# Patient Record
Sex: FEMALE | Race: WHITE | NOT HISPANIC OR LATINO | Employment: UNEMPLOYED | URBAN - NONMETROPOLITAN AREA
[De-identification: names, ages, dates, MRNs, and addresses within clinical notes are randomized per-mention and may not be internally consistent; named-entity substitution may affect disease eponyms.]

---

## 2021-06-29 LAB
EXTERNAL ABO GROUPING: NORMAL
EXTERNAL ANTIBODY SCREEN: NEGATIVE
EXTERNAL CHLAMYDIA SCREEN: NEGATIVE
EXTERNAL GONORRHEA SCREEN: NEGATIVE
EXTERNAL HEPATITIS B SURFACE ANTIGEN: NEGATIVE
EXTERNAL RH FACTOR: POSITIVE
EXTERNAL RUBELLA QUALITATIVE: NORMAL
EXTERNAL SYPHILIS RPR SCREEN: NORMAL
HIV1 P24 AG SERPL QL IA: NORMAL

## 2021-08-03 ENCOUNTER — TRANSCRIBE ORDERS (OUTPATIENT)
Dept: ADMINISTRATIVE | Facility: HOSPITAL | Age: 31
End: 2021-08-03

## 2021-08-03 DIAGNOSIS — R00.0 TACHYCARDIA, UNSPECIFIED: Primary | ICD-10-CM

## 2021-08-04 ENCOUNTER — HOSPITAL ENCOUNTER (OUTPATIENT)
Dept: CARDIOLOGY | Facility: HOSPITAL | Age: 31
Discharge: HOME | End: 2021-08-04
Admitting: NURSE PRACTITIONER

## 2021-08-04 DIAGNOSIS — R00.0 TACHYCARDIA, UNSPECIFIED: ICD-10-CM

## 2021-08-04 LAB
BH CV ECHO MEAS - % IVS THICK: 75.4 %
BH CV ECHO MEAS - % LVPW THICK: 40.1 %
BH CV ECHO MEAS - ACS: 2.1 CM
BH CV ECHO MEAS - AO ROOT AREA (BSA CORRECTED): 1.4
BH CV ECHO MEAS - AO ROOT AREA: 6.4 CM^2
BH CV ECHO MEAS - AO ROOT DIAM: 2.9 CM
BH CV ECHO MEAS - BSA(HAYCOCK): 2.2 M^2
BH CV ECHO MEAS - BSA: 2.1 M^2
BH CV ECHO MEAS - BZI_BMI: 31.9 KILOGRAMS/M^2
BH CV ECHO MEAS - BZI_METRIC_HEIGHT: 172.7 CM
BH CV ECHO MEAS - BZI_METRIC_WEIGHT: 95.3 KG
BH CV ECHO MEAS - EDV(CUBED): 152.3 ML
BH CV ECHO MEAS - EDV(MOD-SP4): 60.7 ML
BH CV ECHO MEAS - EDV(TEICH): 137.7 ML
BH CV ECHO MEAS - EF(CUBED): 72 %
BH CV ECHO MEAS - EF(MOD-SP4): 68.9 %
BH CV ECHO MEAS - EF(TEICH): 63.2 %
BH CV ECHO MEAS - ESV(CUBED): 42.7 ML
BH CV ECHO MEAS - ESV(MOD-SP4): 18.9 ML
BH CV ECHO MEAS - ESV(TEICH): 50.7 ML
BH CV ECHO MEAS - FS: 34.6 %
BH CV ECHO MEAS - IVS/LVPW: 0.87
BH CV ECHO MEAS - IVSD: 0.9 CM
BH CV ECHO MEAS - IVSS: 1.6 CM
BH CV ECHO MEAS - LA DIMENSION: 3.2 CM
BH CV ECHO MEAS - LA/AO: 1.1
BH CV ECHO MEAS - LV DIASTOLIC VOL/BSA (35-75): 29.1 ML/M^2
BH CV ECHO MEAS - LV MASS(C)D: 192.7 GRAMS
BH CV ECHO MEAS - LV MASS(C)DI: 92.3 GRAMS/M^2
BH CV ECHO MEAS - LV MASS(C)S: 194.1 GRAMS
BH CV ECHO MEAS - LV MASS(C)SI: 93 GRAMS/M^2
BH CV ECHO MEAS - LV SYSTOLIC VOL/BSA (12-30): 9.1 ML/M^2
BH CV ECHO MEAS - LVIDD: 5.3 CM
BH CV ECHO MEAS - LVIDS: 3.5 CM
BH CV ECHO MEAS - LVLD AP4: 6.2 CM
BH CV ECHO MEAS - LVLS AP4: 5.6 CM
BH CV ECHO MEAS - LVOT AREA (M): 3.5 CM^2
BH CV ECHO MEAS - LVOT AREA: 3.5 CM^2
BH CV ECHO MEAS - LVOT DIAM: 2.1 CM
BH CV ECHO MEAS - LVPWD: 1 CM
BH CV ECHO MEAS - LVPWS: 1.4 CM
BH CV ECHO MEAS - MV A MAX VEL: 68.9 CM/SEC
BH CV ECHO MEAS - MV E MAX VEL: 109 CM/SEC
BH CV ECHO MEAS - MV E/A: 1.6
BH CV ECHO MEAS - PA ACC TIME: 0.12 SEC
BH CV ECHO MEAS - PA PR(ACCEL): 25 MMHG
BH CV ECHO MEAS - RVDD: 1.5 CM
BH CV ECHO MEAS - SI(CUBED): 52.5 ML/M^2
BH CV ECHO MEAS - SI(MOD-SP4): 20 ML/M^2
BH CV ECHO MEAS - SI(TEICH): 41.7 ML/M^2
BH CV ECHO MEAS - SV(CUBED): 109.6 ML
BH CV ECHO MEAS - SV(MOD-SP4): 41.8 ML
BH CV ECHO MEAS - SV(TEICH): 87 ML
MAXIMAL PREDICTED HEART RATE: 191 BPM
STRESS TARGET HR: 162 BPM

## 2021-08-04 PROCEDURE — 93306 TTE W/DOPPLER COMPLETE: CPT

## 2021-08-04 PROCEDURE — 93306 TTE W/DOPPLER COMPLETE: CPT | Performed by: INTERNAL MEDICINE

## 2021-08-17 ENCOUNTER — HOSPITAL ENCOUNTER (EMERGENCY)
Facility: HOSPITAL | Age: 31
Discharge: HOME | End: 2021-08-17
Attending: STUDENT IN AN ORGANIZED HEALTH CARE EDUCATION/TRAINING PROGRAM | Admitting: STUDENT IN AN ORGANIZED HEALTH CARE EDUCATION/TRAINING PROGRAM

## 2021-08-17 ENCOUNTER — DOCUMENTATION (OUTPATIENT)
Dept: CARDIOLOGY | Facility: CLINIC | Age: 31
End: 2021-08-17

## 2021-08-17 ENCOUNTER — OFFICE VISIT (OUTPATIENT)
Dept: CARDIOLOGY | Facility: CLINIC | Age: 31
End: 2021-08-17

## 2021-08-17 VITALS
WEIGHT: 210 LBS | SYSTOLIC BLOOD PRESSURE: 124 MMHG | HEIGHT: 68 IN | OXYGEN SATURATION: 100 % | RESPIRATION RATE: 18 BRPM | DIASTOLIC BLOOD PRESSURE: 75 MMHG | HEART RATE: 95 BPM | TEMPERATURE: 98.2 F | BODY MASS INDEX: 31.83 KG/M2

## 2021-08-17 VITALS
DIASTOLIC BLOOD PRESSURE: 68 MMHG | SYSTOLIC BLOOD PRESSURE: 108 MMHG | WEIGHT: 210 LBS | TEMPERATURE: 97.9 F | HEIGHT: 68 IN | OXYGEN SATURATION: 99 % | BODY MASS INDEX: 31.83 KG/M2

## 2021-08-17 DIAGNOSIS — Z3A.18 18 WEEKS GESTATION OF PREGNANCY: Primary | ICD-10-CM

## 2021-08-17 DIAGNOSIS — R55 NEAR SYNCOPE: ICD-10-CM

## 2021-08-17 DIAGNOSIS — R00.2 PALPITATIONS: ICD-10-CM

## 2021-08-17 DIAGNOSIS — R00.2 PALPITATIONS: Primary | ICD-10-CM

## 2021-08-17 DIAGNOSIS — R07.89 CHEST DISCOMFORT: ICD-10-CM

## 2021-08-17 DIAGNOSIS — Z3A.18 18 WEEKS GESTATION OF PREGNANCY: ICD-10-CM

## 2021-08-17 LAB
ALBUMIN SERPL-MCNC: 3.7 G/DL (ref 3.5–5.2)
ALBUMIN/GLOB SERPL: 1.5 G/DL
ALP SERPL-CCNC: 55 U/L (ref 39–117)
ALT SERPL W P-5'-P-CCNC: 8 U/L (ref 1–33)
ANION GAP SERPL CALCULATED.3IONS-SCNC: 11.4 MMOL/L (ref 5–15)
AST SERPL-CCNC: 12 U/L (ref 1–32)
BACTERIA UR QL AUTO: ABNORMAL /HPF
BASOPHILS # BLD AUTO: 0.04 10*3/MM3 (ref 0–0.2)
BASOPHILS NFR BLD AUTO: 0.4 % (ref 0–1.5)
BILIRUB SERPL-MCNC: 0.2 MG/DL (ref 0–1.2)
BILIRUB UR QL STRIP: NEGATIVE
BUN SERPL-MCNC: 5 MG/DL (ref 6–20)
BUN/CREAT SERPL: 9.6 (ref 7–25)
CALCIUM SPEC-SCNC: 9.1 MG/DL (ref 8.6–10.5)
CHLORIDE SERPL-SCNC: 106 MMOL/L (ref 98–107)
CLARITY UR: ABNORMAL
CO2 SERPL-SCNC: 19.6 MMOL/L (ref 22–29)
COLOR UR: YELLOW
CREAT SERPL-MCNC: 0.52 MG/DL (ref 0.57–1)
DEPRECATED RDW RBC AUTO: 41.7 FL (ref 37–54)
EOSINOPHIL # BLD AUTO: 0.06 10*3/MM3 (ref 0–0.4)
EOSINOPHIL NFR BLD AUTO: 0.6 % (ref 0.3–6.2)
ERYTHROCYTE [DISTWIDTH] IN BLOOD BY AUTOMATED COUNT: 13.2 % (ref 12.3–15.4)
GFR SERPL CREATININE-BSD FRML MDRD: 139 ML/MIN/1.73
GLOBULIN UR ELPH-MCNC: 2.5 GM/DL
GLUCOSE SERPL-MCNC: 134 MG/DL (ref 65–99)
GLUCOSE UR STRIP-MCNC: ABNORMAL MG/DL
HCG INTACT+B SERPL-ACNC: 9794 MIU/ML
HCT VFR BLD AUTO: 37.3 % (ref 34–46.6)
HGB BLD-MCNC: 12.3 G/DL (ref 12–15.9)
HGB UR QL STRIP.AUTO: NEGATIVE
HOLD SPECIMEN: NORMAL
HOLD SPECIMEN: NORMAL
HYALINE CASTS UR QL AUTO: ABNORMAL /LPF
IMM GRANULOCYTES # BLD AUTO: 0.09 10*3/MM3 (ref 0–0.05)
IMM GRANULOCYTES NFR BLD AUTO: 0.9 % (ref 0–0.5)
KETONES UR QL STRIP: ABNORMAL
LEUKOCYTE ESTERASE UR QL STRIP.AUTO: ABNORMAL
LYMPHOCYTES # BLD AUTO: 1.77 10*3/MM3 (ref 0.7–3.1)
LYMPHOCYTES NFR BLD AUTO: 17.6 % (ref 19.6–45.3)
MCH RBC QN AUTO: 28.7 PG (ref 26.6–33)
MCHC RBC AUTO-ENTMCNC: 33 G/DL (ref 31.5–35.7)
MCV RBC AUTO: 86.9 FL (ref 79–97)
MONOCYTES # BLD AUTO: 0.8 10*3/MM3 (ref 0.1–0.9)
MONOCYTES NFR BLD AUTO: 8 % (ref 5–12)
NEUTROPHILS NFR BLD AUTO: 7.3 10*3/MM3 (ref 1.7–7)
NEUTROPHILS NFR BLD AUTO: 72.5 % (ref 42.7–76)
NITRITE UR QL STRIP: NEGATIVE
NRBC BLD AUTO-RTO: 0 /100 WBC (ref 0–0.2)
PH UR STRIP.AUTO: 6 [PH] (ref 5–8)
PLATELET # BLD AUTO: 240 10*3/MM3 (ref 140–450)
PMV BLD AUTO: 9.7 FL (ref 6–12)
POTASSIUM SERPL-SCNC: 3.8 MMOL/L (ref 3.5–5.2)
PROT SERPL-MCNC: 6.2 G/DL (ref 6–8.5)
PROT UR QL STRIP: NEGATIVE
QT INTERVAL: 328 MS
QTC INTERVAL: 443 MS
RBC # BLD AUTO: 4.29 10*6/MM3 (ref 3.77–5.28)
RBC # UR: ABNORMAL /HPF
REF LAB TEST METHOD: ABNORMAL
SODIUM SERPL-SCNC: 137 MMOL/L (ref 136–145)
SP GR UR STRIP: 1.02 (ref 1–1.03)
SQUAMOUS #/AREA URNS HPF: ABNORMAL /HPF
T4 FREE SERPL-MCNC: 0.89 NG/DL (ref 0.93–1.7)
TROPONIN T SERPL-MCNC: <0.01 NG/ML (ref 0–0.03)
TSH SERPL DL<=0.05 MIU/L-ACNC: 0.81 UIU/ML (ref 0.27–4.2)
UROBILINOGEN UR QL STRIP: ABNORMAL
WBC # BLD AUTO: 10.06 10*3/MM3 (ref 3.4–10.8)
WBC UR QL AUTO: ABNORMAL /HPF
WHOLE BLOOD HOLD SPECIMEN: NORMAL

## 2021-08-17 PROCEDURE — 84484 ASSAY OF TROPONIN QUANT: CPT | Performed by: PHYSICIAN ASSISTANT

## 2021-08-17 PROCEDURE — 84439 ASSAY OF FREE THYROXINE: CPT | Performed by: NURSE PRACTITIONER

## 2021-08-17 PROCEDURE — 93246 EXT ECG>7D<15D RECORDING: CPT | Performed by: INTERNAL MEDICINE

## 2021-08-17 PROCEDURE — 85025 COMPLETE CBC W/AUTO DIFF WBC: CPT | Performed by: PHYSICIAN ASSISTANT

## 2021-08-17 PROCEDURE — 84702 CHORIONIC GONADOTROPIN TEST: CPT | Performed by: PHYSICIAN ASSISTANT

## 2021-08-17 PROCEDURE — 81001 URINALYSIS AUTO W/SCOPE: CPT | Performed by: PHYSICIAN ASSISTANT

## 2021-08-17 PROCEDURE — 99283 EMERGENCY DEPT VISIT LOW MDM: CPT

## 2021-08-17 PROCEDURE — 93010 ELECTROCARDIOGRAM REPORT: CPT | Performed by: INTERNAL MEDICINE

## 2021-08-17 PROCEDURE — 84443 ASSAY THYROID STIM HORMONE: CPT | Performed by: NURSE PRACTITIONER

## 2021-08-17 PROCEDURE — 99204 OFFICE O/P NEW MOD 45 MIN: CPT | Performed by: NURSE PRACTITIONER

## 2021-08-17 PROCEDURE — 80053 COMPREHEN METABOLIC PANEL: CPT | Performed by: PHYSICIAN ASSISTANT

## 2021-08-17 PROCEDURE — 93005 ELECTROCARDIOGRAM TRACING: CPT | Performed by: PHYSICIAN ASSISTANT

## 2021-08-17 NOTE — ED NOTES
MEDICAL SCREENING:    Reason for Visit: elevated heart rate      Patient initially seen in triage.  The patient was advised further evaluation and diagnostic testing will be needed, some of the treatment and testing will be initiated in the lobby in order to begin the process.  The patient will be returned to the waiting area for the time being and possibly be re-assessed by a subsequent ED provider.  The patient will be brought back to the treatment area in as timely manner as possible.       Pito Orellana PA-C  08/17/21 1121

## 2021-08-17 NOTE — PROGRESS NOTES
Patient was in waiting room when  came up to the window stating she was having her episode.  I went out to the waiting room and she was c/o chest pressure, soa, and her pulse was 144.pt is pregnant.    She stood and was near syncope and  caught her in his arms.  .  Was advised to go to the ER. I stated I was calling an ambulance and  refused and stated he would drive her straight to the ER.@ [facility].   \  Kelly NUNO called and notified the ER charge nurse Yudy.

## 2021-08-17 NOTE — ED PROVIDER NOTES
"Subjective   31-year-old female who presents to the emergency department chief complaint \" substernal chest tightness\" just prior to arrival.  Patient had an episode of tachycardia at cardiologist.  Patient has had intermittent syncope and palpitations over the last several months.  Patient reports this is all new.  Was being evaluated by cardiologist earlier this day when was sent to the emergency department.  Patient denies any syncope today.  She is 18 weeks pregnant.  Patient denies any other associated medical problems such as diabetes, heart disease, hypertension.      History provided by:  Patient   used: No    Chest Pain  Pain location:  Substernal area  Pain quality: aching    Pain radiates to:  Does not radiate  Pain severity:  Moderate  Onset quality:  Gradual  Duration:  1 day  Timing:  Intermittent  Progression:  Worsening  Chronicity:  New  Context: not breathing, not drug use, not eating, not lifting and not movement    Relieved by:  Nothing  Worsened by:  Nothing  Ineffective treatments:  None tried  Associated symptoms: palpitations    Associated symptoms: no abdominal pain, no AICD problem, no altered mental status, no anxiety, no back pain, no claudication, no cough, no diaphoresis, no dizziness, no fatigue, no fever, no heartburn, no lower extremity edema, no nausea, no shortness of breath, no syncope, no vomiting and no weakness    Risk factors: no aortic disease, no birth control, no diabetes mellitus, no Lesley-Danlos syndrome, no high cholesterol, no hypertension, no immobilization, not male, not obese, no prior DVT/PE, no smoking and no surgery        Review of Systems   Constitutional: Negative.  Negative for appetite change, chills, diaphoresis, fatigue and fever.   HENT: Negative.  Negative for drooling, ear discharge, ear pain, facial swelling and hearing loss.    Eyes: Negative.  Negative for pain, discharge, redness and itching.   Respiratory: Negative.  Negative " for cough, choking, chest tightness and shortness of breath.    Cardiovascular: Positive for chest pain and palpitations. Negative for claudication and syncope.   Gastrointestinal: Negative for abdominal pain, heartburn, nausea and vomiting.   Endocrine: Negative.  Negative for cold intolerance, heat intolerance, polydipsia, polyphagia and polyuria.   Genitourinary: Negative.  Negative for decreased urine volume, difficulty urinating, dysuria, enuresis, flank pain, frequency, genital sores, hematuria, menstrual problem, pelvic pain and urgency.   Musculoskeletal: Negative.  Negative for back pain.   Skin: Negative.  Negative for color change, pallor and rash.   Neurological: Negative for dizziness and weakness.   Hematological: Negative.  Negative for adenopathy.   Psychiatric/Behavioral: Negative.  Negative for confusion, decreased concentration, dysphoric mood and hallucinations.   All other systems reviewed and are negative.      No past medical history on file.    No Known Allergies    No past surgical history on file.    No family history on file.    Social History     Socioeconomic History   • Marital status:      Spouse name: Not on file   • Number of children: Not on file   • Years of education: Not on file   • Highest education level: Not on file           Objective   Physical Exam  Vitals and nursing note reviewed.   Constitutional:       General: She is not in acute distress.     Appearance: Normal appearance. She is normal weight. She is not ill-appearing, toxic-appearing or diaphoretic.   HENT:      Head: Normocephalic and atraumatic.      Right Ear: Tympanic membrane, ear canal and external ear normal. There is no impacted cerumen.      Left Ear: Tympanic membrane, ear canal and external ear normal. There is no impacted cerumen.      Nose: Nose normal. No congestion or rhinorrhea.      Mouth/Throat:      Mouth: Mucous membranes are moist.      Pharynx: Oropharynx is clear. No oropharyngeal  exudate or posterior oropharyngeal erythema.   Eyes:      General: No scleral icterus.        Right eye: No discharge.         Left eye: No discharge.      Extraocular Movements: Extraocular movements intact.      Conjunctiva/sclera: Conjunctivae normal.      Pupils: Pupils are equal, round, and reactive to light.   Neck:      Vascular: No carotid bruit.   Cardiovascular:      Rate and Rhythm: Normal rate and regular rhythm.      Pulses: Normal pulses.      Heart sounds: Normal heart sounds. No murmur heard.   No friction rub. No gallop.    Pulmonary:      Effort: Pulmonary effort is normal. No respiratory distress.      Breath sounds: Normal breath sounds. No stridor. No wheezing, rhonchi or rales.   Chest:      Chest wall: No tenderness.   Abdominal:      General: Abdomen is flat. Bowel sounds are normal. There is no distension.      Palpations: There is no mass.      Tenderness: There is no abdominal tenderness. There is no right CVA tenderness, left CVA tenderness, guarding or rebound.      Hernia: No hernia is present.   Musculoskeletal:         General: No swelling, tenderness, deformity or signs of injury. Normal range of motion.      Cervical back: Normal range of motion and neck supple. No rigidity or tenderness.      Right lower leg: No edema.      Left lower leg: No edema.   Lymphadenopathy:      Cervical: No cervical adenopathy.   Skin:     General: Skin is warm and dry.      Capillary Refill: Capillary refill takes less than 2 seconds.      Coloration: Skin is not jaundiced or pale.      Findings: No bruising, erythema, lesion or rash.   Neurological:      General: No focal deficit present.      Mental Status: She is alert and oriented to person, place, and time. Mental status is at baseline.      Cranial Nerves: No cranial nerve deficit.      Sensory: No sensory deficit.      Motor: No weakness.      Coordination: Coordination normal.      Gait: Gait normal.      Deep Tendon Reflexes: Reflexes normal.    Psychiatric:         Mood and Affect: Mood normal.         Behavior: Behavior normal.         Thought Content: Thought content normal.         Judgment: Judgment normal.         Procedures           ED Course  ED Course as of Aug 17 1417   Tue Aug 17, 2021   1106 EKG interpretation, ventricular rate 110, , QRS 82, QTc 443, sinus tachycardia, no acute ST elevation.    [JM]      ED Course User Index  [JM] Jono Camara,                                            MDM    Final diagnoses:   18 weeks gestation of pregnancy   Chest discomfort       ED Disposition  ED Disposition     ED Disposition Condition Comment    Discharge Stable           Jada Perez DO  [address] [facility] [address] [phone]    Call in 1 day           Medication List      No changes were made to your prescriptions during this visit.          Pito Orellana PA-C  08/17/21 7604

## 2021-08-17 NOTE — PROGRESS NOTES
Subjective   Yael Robbins is a 31 y.o. female.   Chief Complaint   Patient presents with   • Establish Care     Follow up   • Chest Pain     chest pressure   • Shortness of Breath     assoc with chest pressure      History of Present Illness   Yael Robbins is a 31-year-old  female who presents to the clinic today as a new patient for cardiac evaluation.  She is accompanied by her .    She complains of tachycardia that has been going on for several weeks.  Episodes occur at rest and with activity.  When these episodes do occur she does become dizzy and lightheadedness there is some questionable syncopal/near syncope episodes with these tachycardic events. She was in our clinic earlier today and due to tachycardia and lightheadedness was sent to the ER for further evaluation.  It was felt she would benefit from a monitor and was thus referred back to our office for evaluation.  She reports blood pressures have been in around 120-130/80.  She is in her 18th-19th week of pregnancy and has been following with her OB/GYN.  She denies history of known anemia or thyroid problems.  Her OB/GYN had ordered a recent echocardiogram which she had completed.  This is her second pregnancy and no pregnancy complications at this point during her pregnancy.  She denies excessive caffeine intake.  She reports adequate fluid intake.  She denies lower extremity swelling.  She does report some chest tightness with these episodes.    The following portions of the patient's history were reviewed and updated as appropriate: allergies, current medications, past family history, past medical history, past social history, past surgical history and problem list.    Current Outpatient Medications:   •  Pyridoxine HCl (B6 NATURAL PO), Take  by mouth Daily., Disp: , Rfl:     Review of Systems   Constitutional: Negative for activity change, appetite change, chills, fatigue and fever.   HENT: Negative for congestion, drooling, ear  "discharge, ear pain, mouth sores, nosebleeds, postnasal drip, rhinorrhea, sneezing and sore throat.    Eyes: Negative for pain, discharge and visual disturbance.   Respiratory: Positive for chest tightness. Negative for cough, shortness of breath and wheezing.    Cardiovascular: Positive for palpitations. Negative for chest pain and leg swelling.   Gastrointestinal: Negative for abdominal pain, constipation, diarrhea, nausea and vomiting.   Skin: Negative for rash and wound.   Neurological: Negative for headaches.   All other systems reviewed and are negative.    /68 (BP Location: Left arm, Patient Position: Sitting, Cuff Size: Adult)   Temp 97.9 °F (36.6 °C)   Ht 172.7 cm (68\")   Wt 95.3 kg (210 lb)   SpO2 99%   BMI 31.93 kg/m²     Objective   No Known Allergies    Physical Exam  Vitals reviewed.   Constitutional:       Appearance: Normal appearance. She is well-developed.   HENT:      Head: Normocephalic.   Eyes:      Conjunctiva/sclera: Conjunctivae normal.      Pupils: Pupils are equal, round, and reactive to light.   Neck:      Thyroid: No thyromegaly.      Vascular: No carotid bruit or JVD.   Cardiovascular:      Rate and Rhythm: Normal rate and regular rhythm.   Pulmonary:      Effort: Pulmonary effort is normal.      Breath sounds: Normal breath sounds.   Abdominal:      General: Bowel sounds are normal.      Palpations: Abdomen is soft. There is no hepatomegaly, splenomegaly or mass.      Tenderness: There is no abdominal tenderness.   Musculoskeletal:         General: Normal range of motion.      Cervical back: Normal range of motion and neck supple.      Right lower leg: No edema.      Left lower leg: No edema.   Skin:     General: Skin is warm and dry.   Neurological:      Mental Status: She is alert and oriented to person, place, and time.   Psychiatric:         Attention and Perception: Attention normal.         Mood and Affect: Mood normal.         Speech: Speech normal.         Behavior: " Behavior normal. Behavior is cooperative.         Cognition and Memory: Cognition normal.     Yael Robbins  Echo Complete w/Doppler and Color Flow  Order# [id number]  Reading physician: Lew Espinoza MD Ordering physician: Karyn Barrientos APRN Study date: 21   Patient Information    Patient Name   Yael Robbins MRN   [id number] Legal Sex   Female  (Age)   1990 (31 y.o.)   PACS Images     Show images for Adult Transthoracic Echo Complete W/ Cont if Necessary Per Protocol    Sedation Narrator Report    Sedation Narrator Report      Interpretation Summary    · Normal left ventricular cavity size and wall thickness noted. All left ventricular wall segments contract normally.  · Left ventricular ejection fraction appears to be 61 - 65%.  · The aortic valve is structurally normal with no regurgitation or stenosis present.  · The mitral valve is structurally normal with no regurgitation or significant stenosis present.  · There is no evidence of pericardial effusion.         Assessment/Plan   Diagnoses and all orders for this visit:    1. Palpitations (Primary)  -     Holter Monitor - 72 Hour Up To 15 Days; Future  -     TSH; Future  -     T4, Free; Future  Discussed initiation of beta-blockers with risk and benefits during pregnancy.  She would like to await testing results before initiating therapy.    2. Near syncope  -     Holter Monitor - 72 Hour Up To 15 Days; Future  Await results, encourage in adequate hydration    3. 18 weeks gestation of pregnancy  Follow-up with OB/GYN    Advised if new or worsening symptoms while waiting work-up go to the ER.  Reviewed and discussed Ekg and laboratory testing from [facility] ER.  Reviewed and discussed echocardiogram read by Dr. Espinoza. Follow-up in 4 weeks, sooner if needed

## 2021-08-20 ENCOUNTER — TELEPHONE (OUTPATIENT)
Dept: CARDIOLOGY | Facility: CLINIC | Age: 31
End: 2021-08-20

## 2021-08-20 NOTE — TELEPHONE ENCOUNTER
----- Message from YAAKOV Buckley sent at 8/20/2021  1:14 PM EDT -----  TSH -low normal  Free T4 mildly decreased  Please fax to PCP and OB/GYN.  Recommend close follow-up.

## 2021-09-13 PROCEDURE — 93248 EXT ECG>7D<15D REV&INTERPJ: CPT | Performed by: INTERNAL MEDICINE

## 2021-09-14 ENCOUNTER — TREATMENT (OUTPATIENT)
Dept: CARDIOLOGY | Facility: CLINIC | Age: 31
End: 2021-09-14

## 2021-09-14 DIAGNOSIS — R00.2 PALPITATIONS: ICD-10-CM

## 2021-09-14 DIAGNOSIS — I47.29 VENTRICULAR TACHYCARDIA (PAROXYSMAL) (HCC): ICD-10-CM

## 2021-09-14 DIAGNOSIS — R55 NEAR SYNCOPE: ICD-10-CM

## 2021-09-16 ENCOUNTER — TELEPHONE (OUTPATIENT)
Dept: CARDIOLOGY | Facility: CLINIC | Age: 31
End: 2021-09-16

## 2021-09-16 NOTE — TELEPHONE ENCOUNTER
Called and given message per Kelly NUNO.  She stated she would like to discuss with her OB Dr regarding the medication as she has an appt next week with them.  I told her to call me if she was interested in starting the metoprolol and to keep her f/u appt with Kelly randall.

## 2021-09-16 NOTE — TELEPHONE ENCOUNTER
----- Message from YAAKOV Buckley sent at 9/16/2021  3:34 PM EDT -----  Holter monitor showing SVT - fast heart rate which could be contributing to her symptoms. If she is still have tachycardia and near syncope, I would recommend starting Metoprolol XL 12.5 mg daily, understanding risks and benefits of pregnancy as we discussed at last visit. Keep scheduled follow up.   ----- Message -----  From: Sunshine Shin MA  Sent: 9/14/2021  10:18 AM EDT  To: YAAKOV Buckley    Please review EOS report on holter monitor.      Thanks   Sunshine

## 2021-10-28 ENCOUNTER — OFFICE VISIT (OUTPATIENT)
Dept: CARDIOLOGY | Facility: CLINIC | Age: 31
End: 2021-10-28

## 2021-10-28 VITALS
OXYGEN SATURATION: 99 % | WEIGHT: 218 LBS | HEART RATE: 101 BPM | HEIGHT: 68 IN | TEMPERATURE: 97.3 F | SYSTOLIC BLOOD PRESSURE: 118 MMHG | BODY MASS INDEX: 33.04 KG/M2 | DIASTOLIC BLOOD PRESSURE: 84 MMHG

## 2021-10-28 DIAGNOSIS — Z3A.30 30 WEEKS GESTATION OF PREGNANCY: ICD-10-CM

## 2021-10-28 DIAGNOSIS — R00.2 PALPITATIONS: Primary | ICD-10-CM

## 2021-10-28 PROCEDURE — 99213 OFFICE O/P EST LOW 20 MIN: CPT | Performed by: NURSE PRACTITIONER

## 2021-10-28 NOTE — PROGRESS NOTES
Subjective   Yael Robbins is a 31 y.o. female.   Chief Complaint   Patient presents with   • Results     Holter Monitor   • Palpitations     Follow up  pt states she is alot better since starting metoprolol   • Rapid Heart Rate     today      History of Present Illness   Yael Robbins is a 31-year-old  female who presents to the clinic today for follow-up.    Palpitations are much improved on metoprolol.  She states that since starting the medication she noticed significant improvement in her symptoms.  She denies dizziness, lightheadedness, near syncope syncopal episodes.  She reports blood pressure and heart rate with intermittent monitoring seem to be doing well.  We have performed labs showing a normal TSH and a mildly low free T4.  This was further discussed with her OB whom she reports was not concerned currently and would continue to monitor.     Pregnancy currently in her 30th week.  Following with OB.  She denies any complications and reports pregnancy is going as planned.    The following portions of the patient's history were reviewed and updated as appropriate: allergies, current medications, past family history, past medical history, past social history, past surgical history and problem list.    Current Outpatient Medications:   •  metoprolol tartrate (LOPRESSOR) 25 MG tablet, Take 12.5 mg by mouth 2 (Two) Times a Day., Disp: , Rfl:   •  Pyridoxine HCl (B6 NATURAL PO), Take  by mouth Daily., Disp: , Rfl:     Review of Systems   Constitutional: Negative for activity change, appetite change, chills, diaphoresis, fatigue and fever.   HENT: Negative for congestion, drooling, ear discharge, ear pain, mouth sores, nosebleeds, postnasal drip, rhinorrhea, sinus pressure, sneezing and sore throat.    Eyes: Negative for pain, discharge and visual disturbance.   Respiratory: Negative for cough, chest tightness, shortness of breath and wheezing.    Cardiovascular: Negative for chest pain, palpitations  "and leg swelling.   Gastrointestinal: Negative for abdominal pain, constipation, diarrhea, nausea and vomiting.   Endocrine: Negative for cold intolerance, heat intolerance, polydipsia, polyphagia and polyuria.   Musculoskeletal: Negative for arthralgias, myalgias and neck pain.   Skin: Negative for rash and wound.   Neurological: Negative for syncope, speech difficulty, weakness, light-headedness and headaches.   Hematological: Negative for adenopathy. Does not bruise/bleed easily.   Psychiatric/Behavioral: Negative for confusion, dysphoric mood and sleep disturbance. The patient is not nervous/anxious.    All other systems reviewed and are negative.    /84 (BP Location: Left arm, Patient Position: Sitting, Cuff Size: Adult)   Pulse 101   Temp 97.3 °F (36.3 °C)   Ht 172.7 cm (68\")   Wt 98.9 kg (218 lb)   SpO2 99%   BMI 33.15 kg/m²     Objective   No Known Allergies    Physical Exam  Vitals reviewed.   Constitutional:       Appearance: Normal appearance. She is well-developed.   HENT:      Head: Normocephalic.   Eyes:      Conjunctiva/sclera: Conjunctivae normal.   Neck:      Thyroid: No thyromegaly.      Vascular: No carotid bruit or JVD.   Cardiovascular:      Rate and Rhythm: Normal rate and regular rhythm.   Pulmonary:      Effort: Pulmonary effort is normal.      Breath sounds: Normal breath sounds.   Abdominal:      Comments: 30 weeks pregnant    Musculoskeletal:      Right lower leg: No edema.      Left lower leg: No edema.   Skin:     General: Skin is warm and dry.   Neurological:      Mental Status: She is alert and oriented to person, place, and time.   Psychiatric:         Attention and Perception: Attention normal.         Mood and Affect: Mood normal.         Speech: Speech normal.         Behavior: Behavior normal. Behavior is cooperative.         Cognition and Memory: Cognition normal.         Assessment/Plan   Diagnoses and all orders for this visit:    1. Palpitations " (Primary)  Controlled on metoprolol.  Encourage in limiting caffeine intake.    2. 30 weeks gestation of pregnancy  Follow-up with OB/GYN    Follow-up in 6 months, sooner if needed

## 2021-11-24 ENCOUNTER — TELEPHONE (OUTPATIENT)
Dept: OBSTETRICS AND GYNECOLOGY | Facility: HOSPITAL | Age: 31
End: 2021-11-24

## 2021-11-24 NOTE — TELEPHONE ENCOUNTER
Maternal Child Initial Intake    Patient Name: Yael Robbins     Preferred Name: Yael     YOB: 1990     Patient E-mail Address: none given                Patient gives permission for information/resources to be emailed: no    Currently Employed: No    How well do you speak English? Very Well     Services: No    Language Spoken/Preferred English    Willingness to participate in [employer]: yes    Home Phone: [phone]  Cell Phone: [phone]  Alternate/Work/School Phone: none  Best Time for Contacting: anytime  Best Method for Contacting: Home  May we contact you by phone: yes  May we contact you by mail: yes  Highest Level of Education to Date: High school diploma    Professional Contacts  Type of Provider Name Next Appointment   OB/GYN Provider:     Primary Care Provider:       Dental Provider:     Speciality Provider:      Pediatrician Chosen         Have you seen dentist in last 6 months: [] YES    [] NO    Other Providers/Services Presently Utilizing:   WIC (Women, Infant, Children)    Pregnancy Confirmed: Yes  No LMP recorded. Patient is pregnant. No LMP recorded. Patient is pregnant.   GUSTAVO: 2022 Based Upon Other  Feeding Plan: [x] Breast [] Bottle  Current Pregnancy Related Symptoms, Concerns, or Questions: none  No Known Allergies   Prior to Admission medications    Medication Sig Start Date End Date Taking? Authorizing Provider   metoprolol tartrate (LOPRESSOR) 25 MG tablet Take 12.5 mg by mouth 2 (Two) Times a Day. 10/25/21   Kelly Phelps APRN      OB/GYN Specific History:   Other Tachycardia, treated    No LMP recorded. Patient is pregnant.   Estimated Date of Delivery: 1/15/22   Pregnancy History:   Social History:   Sexually Active: Yes Partners: Male  Birth Control/Protection Post Delivery: none    Pregnancy History:  Current Pregnancy Baby Sex: none  Date: GA (wks) Birth Wt Sex Del Type Place of Del Comments/Complications Living (Y/N)                                                                            No past medical history on file.   Past Surgical History:   Procedure Laterality Date   •  SECTION        Implants: none  Family History   Problem Relation Age of Onset   • No Known Problems Mother    • Hypertension Father    • Leukemia Sister         Social History:  Smoking Status: Never a smoker.  Cigarettes   Passive Exposure: no  Counseling Given: yes  Smokeless Tobacco: Never   Alcohol Use: No   Drinks/wk: 0   Substance Use History: No  Substances Used & Use/Wk: 0    [facility] Domestic Violence/Abuse Screening  Within the last year, have you been:  Humiliated or emotionally abused in other ways by your partner or ex-partner no  Afraid of your partner or ex-partner no  Raped or forced to have any kind of sexual activity by your partner or ex-partner no  Kicked, hit, slapped, or otherwise physically hurt by your partner or ex-partner no  Feels Unsafe at home or work/school: no  Feels Threatened by Someone no  Does anyone try to keep you from having contact with others/doing things outside your home: no  History of physical, mental, emotional, financial abuse/neglect: no    Spiritual, Cultural, Religous, Value Awareness  Any Spiritual, Cultural, or Sabianism Beliefs/Practices/Values that we need to be mindful of throughout your maternity experience: no    Resource/Environmental Concerns:  Current Living Arrangement: home/apt/condo  Resource/Environmental Concerns:   None    Disability/Function:  Do you have:  Difficulty Hearing or Deaf: no  Difficulty Seeing or Blind: no  Difficulty Concentrating or with Decision-Making: no  Difficulty Communicating: no  Difficulty with Comprehension/Understanding of Information Provided: no  Difficulty with Performing Activities of Daily Living: no    Accommodations/Assistive Devices Utilized (e.g. hearing aids, sign language, braille, service animal, /aide, etc.): none    Equipment Presently  Utilized/Available at Home: Other none  Education:   Preferred Language for Discussing Healthcare: English  Ability to Read: yes  Ability to Write: yes    Preferred learning method: listening, reading and demonstration  Learning Barriers: none  Topic Education Information Comments        Hospital Education Programs [] Provided                         [] Acknowledged                [] Refused    [facility] Maternal-Child Department [] Provided                         [] Acknowledged                [] Refused    Signs or Symptoms to Report [] Provided                         [] Acknowledged                [] Refused    Other:  [] Provided                         [] Acknowledged                [] Refused    Comments:       Significant Other/Support Person: Name:                        Relationship:   Do you have MyChart?  [] YES    [x] NO   Specific Resources Provided and Method: How to find a pediatrician, How to find a primary care provider, How to sign up for MyChart, Smoking/Vaping Sensation, WIC Benefits Sent via: Discussed with patient during telephone conversation and phone numbers provided for insurance provider and WIC office. Pt given detailed instructions for registering for [employer] site.  Do you have the [employer] Roxanne?  [] YES    [x] NO     Intake Summary   [x] Intake completed, will follow-up with patient: yes  [x] Discussed community resources and information provided or assisted with appointments (see notes)  [] Other, including any provider notifications (see notes)  [] Declines Project [employer] Participation  Melyssa Alexandra, RN   Maternal Nurse Navigator

## 2021-12-16 LAB — EXTERNAL GROUP B STREP ANTIGEN: POSITIVE

## 2021-12-21 ENCOUNTER — TRANSCRIBE ORDERS (OUTPATIENT)
Dept: ADMINISTRATIVE | Facility: HOSPITAL | Age: 31
End: 2021-12-21

## 2021-12-21 DIAGNOSIS — Z01.818 PRE-OPERATIVE CLEARANCE: Primary | ICD-10-CM

## 2021-12-30 ENCOUNTER — PREP FOR SURGERY (OUTPATIENT)
Dept: OTHER | Facility: HOSPITAL | Age: 31
End: 2021-12-30

## 2021-12-30 DIAGNOSIS — O34.219 HISTORY OF CESAREAN DELIVERY AFFECTING PREGNANCY: Primary | ICD-10-CM

## 2021-12-30 RX ORDER — PROMETHAZINE HYDROCHLORIDE 12.5 MG/1
12.5 TABLET ORAL EVERY 6 HOURS PRN
Status: CANCELLED | OUTPATIENT
Start: 2021-12-30

## 2021-12-30 RX ORDER — CARBOPROST TROMETHAMINE 250 UG/ML
250 INJECTION, SOLUTION INTRAMUSCULAR AS NEEDED
Status: CANCELLED | OUTPATIENT
Start: 2021-12-30

## 2021-12-30 RX ORDER — METHYLERGONOVINE MALEATE 0.2 MG/ML
200 INJECTION INTRAVENOUS AS NEEDED
Status: CANCELLED | OUTPATIENT
Start: 2021-12-30

## 2021-12-30 RX ORDER — OXYTOCIN-SODIUM CHLORIDE 0.9% IV SOLN 30 UNIT/500ML 30-0.9/5 UT/ML-%
125 SOLUTION INTRAVENOUS CONTINUOUS PRN
Status: CANCELLED | OUTPATIENT
Start: 2021-12-30

## 2021-12-30 RX ORDER — ONDANSETRON 4 MG/1
4 TABLET, FILM COATED ORAL EVERY 6 HOURS PRN
Status: CANCELLED | OUTPATIENT
Start: 2021-12-30

## 2021-12-30 RX ORDER — SODIUM CHLORIDE 0.9 % (FLUSH) 0.9 %
3-10 SYRINGE (ML) INJECTION AS NEEDED
Status: CANCELLED | OUTPATIENT
Start: 2021-12-30

## 2021-12-30 RX ORDER — MISOPROSTOL 100 UG/1
600 TABLET ORAL AS NEEDED
Status: CANCELLED | OUTPATIENT
Start: 2021-12-30

## 2021-12-30 RX ORDER — MAGNESIUM HYDROXIDE 1200 MG/15ML
3000 LIQUID ORAL ONCE AS NEEDED
Status: CANCELLED | OUTPATIENT
Start: 2021-12-30

## 2021-12-30 RX ORDER — SODIUM CHLORIDE, SODIUM LACTATE, POTASSIUM CHLORIDE, CALCIUM CHLORIDE 600; 310; 30; 20 MG/100ML; MG/100ML; MG/100ML; MG/100ML
125 INJECTION, SOLUTION INTRAVENOUS CONTINUOUS
Status: CANCELLED | OUTPATIENT
Start: 2021-12-30

## 2021-12-30 RX ORDER — CEFAZOLIN SODIUM 2 G/50ML
2 SOLUTION INTRAVENOUS ONCE
Status: CANCELLED | OUTPATIENT
Start: 2021-12-30 | End: 2021-12-30

## 2021-12-30 RX ORDER — OXYTOCIN-SODIUM CHLORIDE 0.9% IV SOLN 30 UNIT/500ML 30-0.9/5 UT/ML-%
999 SOLUTION INTRAVENOUS ONCE
Status: CANCELLED | OUTPATIENT
Start: 2021-12-30 | End: 2021-12-30

## 2021-12-30 RX ORDER — PROMETHAZINE HYDROCHLORIDE 12.5 MG/1
12.5 SUPPOSITORY RECTAL EVERY 6 HOURS PRN
Status: CANCELLED | OUTPATIENT
Start: 2021-12-30

## 2021-12-30 RX ORDER — ONDANSETRON 2 MG/ML
4 INJECTION INTRAMUSCULAR; INTRAVENOUS EVERY 6 HOURS PRN
Status: CANCELLED | OUTPATIENT
Start: 2021-12-30

## 2021-12-30 RX ORDER — OXYTOCIN-SODIUM CHLORIDE 0.9% IV SOLN 30 UNIT/500ML 30-0.9/5 UT/ML-%
250 SOLUTION INTRAVENOUS CONTINUOUS
Status: CANCELLED | OUTPATIENT
Start: 2021-12-30 | End: 2021-12-30

## 2022-01-02 ENCOUNTER — HOSPITAL ENCOUNTER (OUTPATIENT)
Facility: HOSPITAL | Age: 32
Discharge: HOME | End: 2022-01-02
Attending: OBSTETRICS & GYNECOLOGY | Admitting: OBSTETRICS & GYNECOLOGY

## 2022-01-02 VITALS
HEART RATE: 98 BPM | OXYGEN SATURATION: 98 % | RESPIRATION RATE: 16 BRPM | SYSTOLIC BLOOD PRESSURE: 135 MMHG | TEMPERATURE: 98.3 F | WEIGHT: 231.8 LBS | BODY MASS INDEX: 36.38 KG/M2 | DIASTOLIC BLOOD PRESSURE: 78 MMHG | HEIGHT: 67 IN

## 2022-01-02 PROBLEM — Z34.90 PREGNANCY: Status: ACTIVE | Noted: 2022-01-02

## 2022-01-02 LAB — A1 MICROGLOB PLACENTAL VAG QL: NEGATIVE

## 2022-01-02 PROCEDURE — 84112 EVAL AMNIOTIC FLUID PROTEIN: CPT | Performed by: OBSTETRICS & GYNECOLOGY

## 2022-01-02 PROCEDURE — 59025 FETAL NON-STRESS TEST: CPT

## 2022-01-02 PROCEDURE — G0463 HOSPITAL OUTPT CLINIC VISIT: HCPCS

## 2022-01-03 NOTE — NON STRESS TEST
Yael Robbins, a  at 38w1d with an GUSTAVO of 1/15/2022, Date entered prior to episode creation, [facility] for a nonstress test.    Chief Complaint   Patient presents with   • Leaking Fluid     Gush of whitish fluid, denies any VB having some mild contractions and has +FM       Patient Active Problem List   Diagnosis   • Pregnancy       Start Time:   Stop Time:     Interpretation A  Nonstress Test Interpretation A: Reactive (22 : Nakia Pringle, RN)  Comments A: Verified by Xiao Summers RN (22 : Nakia Pringle, RN)

## 2022-01-03 NOTE — DISCHARGE INSTRUCTIONS
Make sure and keep your follow up appt. Return to L&D for any signs or symptoms of labor, contractions, leaking of fluid, vaginal bleeding or if you are not feeling your baby move as usual.

## 2022-01-04 ENCOUNTER — LAB (OUTPATIENT)
Dept: LAB | Facility: HOSPITAL | Age: 32
End: 2022-01-04

## 2022-01-04 DIAGNOSIS — Z01.818 PRE-OPERATIVE CLEARANCE: ICD-10-CM

## 2022-01-04 LAB — SARS-COV-2 RNA PNL SPEC NAA+PROBE: NOT DETECTED

## 2022-01-04 PROCEDURE — C9803 HOPD COVID-19 SPEC COLLECT: HCPCS

## 2022-01-04 PROCEDURE — U0004 COV-19 TEST NON-CDC HGH THRU: HCPCS | Performed by: OBSTETRICS & GYNECOLOGY

## 2022-01-06 ENCOUNTER — HOSPITAL ENCOUNTER (INPATIENT)
Facility: HOSPITAL | Age: 32
LOS: 3 days | Discharge: HOME | End: 2022-01-09
Attending: OBSTETRICS & GYNECOLOGY | Admitting: OBSTETRICS & GYNECOLOGY

## 2022-01-06 ENCOUNTER — ANESTHESIA EVENT (OUTPATIENT)
Dept: LABOR AND DELIVERY | Facility: HOSPITAL | Age: 32
End: 2022-01-06

## 2022-01-06 ENCOUNTER — ANESTHESIA (OUTPATIENT)
Dept: LABOR AND DELIVERY | Facility: HOSPITAL | Age: 32
End: 2022-01-06

## 2022-01-06 DIAGNOSIS — O34.219 HISTORY OF CESAREAN DELIVERY AFFECTING PREGNANCY: ICD-10-CM

## 2022-01-06 LAB
ABO GROUP BLD: NORMAL
ABO GROUP BLD: NORMAL
AMPHET+METHAMPHET UR QL: NEGATIVE
AMPHETAMINES UR QL: NEGATIVE
BARBITURATES UR QL SCN: NEGATIVE
BASOPHILS # BLD AUTO: 0.04 10*3/MM3 (ref 0–0.2)
BASOPHILS NFR BLD AUTO: 0.4 % (ref 0–1.5)
BENZODIAZ UR QL SCN: NEGATIVE
BLD GP AB SCN SERPL QL: NEGATIVE
BUPRENORPHINE SERPL-MCNC: NEGATIVE NG/ML
CANNABINOIDS SERPL QL: NEGATIVE
COCAINE UR QL: NEGATIVE
DEPRECATED RDW RBC AUTO: 41.3 FL (ref 37–54)
EOSINOPHIL # BLD AUTO: 0.11 10*3/MM3 (ref 0–0.4)
EOSINOPHIL NFR BLD AUTO: 1.2 % (ref 0.3–6.2)
ERYTHROCYTE [DISTWIDTH] IN BLOOD BY AUTOMATED COUNT: 13.3 % (ref 12.3–15.4)
HCT VFR BLD AUTO: 36.3 % (ref 34–46.6)
HGB BLD-MCNC: 11.6 G/DL (ref 12–15.9)
IMM GRANULOCYTES # BLD AUTO: 0.11 10*3/MM3 (ref 0–0.05)
IMM GRANULOCYTES NFR BLD AUTO: 1.2 % (ref 0–0.5)
LYMPHOCYTES # BLD AUTO: 2.01 10*3/MM3 (ref 0.7–3.1)
LYMPHOCYTES NFR BLD AUTO: 21.9 % (ref 19.6–45.3)
MCH RBC QN AUTO: 27 PG (ref 26.6–33)
MCHC RBC AUTO-ENTMCNC: 32 G/DL (ref 31.5–35.7)
MCV RBC AUTO: 84.6 FL (ref 79–97)
METHADONE UR QL SCN: NEGATIVE
MONOCYTES # BLD AUTO: 0.95 10*3/MM3 (ref 0.1–0.9)
MONOCYTES NFR BLD AUTO: 10.3 % (ref 5–12)
NEUTROPHILS NFR BLD AUTO: 5.97 10*3/MM3 (ref 1.7–7)
NEUTROPHILS NFR BLD AUTO: 65 % (ref 42.7–76)
NRBC BLD AUTO-RTO: 0 /100 WBC (ref 0–0.2)
OPIATES UR QL: NEGATIVE
OXYCODONE UR QL SCN: NEGATIVE
PCP UR QL SCN: NEGATIVE
PLATELET # BLD AUTO: 234 10*3/MM3 (ref 140–450)
PMV BLD AUTO: 10.5 FL (ref 6–12)
PROPOXYPH UR QL: NEGATIVE
RBC # BLD AUTO: 4.29 10*6/MM3 (ref 3.77–5.28)
RH BLD: POSITIVE
RH BLD: POSITIVE
T&S EXPIRATION DATE: NORMAL
TRICYCLICS UR QL SCN: NEGATIVE
WBC NRBC COR # BLD: 9.19 10*3/MM3 (ref 3.4–10.8)

## 2022-01-06 PROCEDURE — 0 MORPHINE PER 10 MG: Performed by: NURSE ANESTHETIST, CERTIFIED REGISTERED

## 2022-01-06 PROCEDURE — 25010000002 PHENYLEPHRINE 10 MG/ML SOLUTION: Performed by: NURSE ANESTHETIST, CERTIFIED REGISTERED

## 2022-01-06 PROCEDURE — 86850 RBC ANTIBODY SCREEN: CPT | Performed by: NURSE PRACTITIONER

## 2022-01-06 PROCEDURE — 86900 BLOOD TYPING SEROLOGIC ABO: CPT

## 2022-01-06 PROCEDURE — 25010000002 FENTANYL CITRATE (PF) 50 MCG/ML SOLUTION: Performed by: NURSE ANESTHETIST, CERTIFIED REGISTERED

## 2022-01-06 PROCEDURE — 25010000002 ONDANSETRON PER 1 MG: Performed by: OBSTETRICS & GYNECOLOGY

## 2022-01-06 PROCEDURE — 86901 BLOOD TYPING SEROLOGIC RH(D): CPT

## 2022-01-06 PROCEDURE — 86901 BLOOD TYPING SEROLOGIC RH(D): CPT | Performed by: NURSE PRACTITIONER

## 2022-01-06 PROCEDURE — 86900 BLOOD TYPING SEROLOGIC ABO: CPT | Performed by: NURSE PRACTITIONER

## 2022-01-06 PROCEDURE — 25010000002 KETOROLAC TROMETHAMINE PER 15 MG: Performed by: OBSTETRICS & GYNECOLOGY

## 2022-01-06 PROCEDURE — 85025 COMPLETE CBC W/AUTO DIFF WBC: CPT | Performed by: NURSE PRACTITIONER

## 2022-01-06 PROCEDURE — 80306 DRUG TEST PRSMV INSTRMNT: CPT | Performed by: OBSTETRICS & GYNECOLOGY

## 2022-01-06 RX ORDER — CEFAZOLIN SODIUM 2 G/50ML
2 SOLUTION INTRAVENOUS ONCE
Status: DISCONTINUED | OUTPATIENT
Start: 2022-01-06 | End: 2022-01-06 | Stop reason: HOSPADM

## 2022-01-06 RX ORDER — PRENATAL VIT/IRON FUM/FOLIC AC 27MG-0.8MG
1 TABLET ORAL DAILY
Status: CANCELLED | OUTPATIENT
Start: 2022-01-06

## 2022-01-06 RX ORDER — MORPHINE SULFATE 2 MG/ML
2 INJECTION, SOLUTION INTRAMUSCULAR; INTRAVENOUS
Status: DISCONTINUED | OUTPATIENT
Start: 2022-01-06 | End: 2022-01-09 | Stop reason: HOSPADM

## 2022-01-06 RX ORDER — ONDANSETRON 2 MG/ML
4 INJECTION INTRAMUSCULAR; INTRAVENOUS EVERY 6 HOURS PRN
Status: DISCONTINUED | OUTPATIENT
Start: 2022-01-06 | End: 2022-01-09 | Stop reason: HOSPADM

## 2022-01-06 RX ORDER — OXYTOCIN-SODIUM CHLORIDE 0.9% IV SOLN 30 UNIT/500ML 30-0.9/5 UT/ML-%
250 SOLUTION INTRAVENOUS CONTINUOUS
Status: ACTIVE | OUTPATIENT
Start: 2022-01-06 | End: 2022-01-06

## 2022-01-06 RX ORDER — FENTANYL CITRATE 50 UG/ML
INJECTION, SOLUTION INTRAMUSCULAR; INTRAVENOUS AS NEEDED
Status: DISCONTINUED | OUTPATIENT
Start: 2022-01-06 | End: 2022-01-06 | Stop reason: SURG

## 2022-01-06 RX ORDER — SIMETHICONE 80 MG
80 TABLET,CHEWABLE ORAL 4 TIMES DAILY PRN
Status: DISCONTINUED | OUTPATIENT
Start: 2022-01-06 | End: 2022-01-09 | Stop reason: HOSPADM

## 2022-01-06 RX ORDER — CARBOPROST TROMETHAMINE 250 UG/ML
250 INJECTION, SOLUTION INTRAMUSCULAR EVERY 4 HOURS PRN
Status: DISCONTINUED | OUTPATIENT
Start: 2022-01-06 | End: 2022-01-09 | Stop reason: HOSPADM

## 2022-01-06 RX ORDER — OXYTOCIN-SODIUM CHLORIDE 0.9% IV SOLN 30 UNIT/500ML 30-0.9/5 UT/ML-%
SOLUTION INTRAVENOUS
Status: COMPLETED
Start: 2022-01-06 | End: 2022-01-06

## 2022-01-06 RX ORDER — BUPIVACAINE HYDROCHLORIDE 7.5 MG/ML
INJECTION, SOLUTION EPIDURAL; RETROBULBAR AS NEEDED
Status: DISCONTINUED | OUTPATIENT
Start: 2022-01-06 | End: 2022-01-06 | Stop reason: SURG

## 2022-01-06 RX ORDER — PROMETHAZINE HYDROCHLORIDE 25 MG/1
12.5 TABLET ORAL EVERY 6 HOURS PRN
Status: DISCONTINUED | OUTPATIENT
Start: 2022-01-06 | End: 2022-01-06 | Stop reason: HOSPADM

## 2022-01-06 RX ORDER — DIPHENHYDRAMINE HCL 25 MG
25 CAPSULE ORAL EVERY 4 HOURS PRN
Status: DISCONTINUED | OUTPATIENT
Start: 2022-01-06 | End: 2022-01-09 | Stop reason: HOSPADM

## 2022-01-06 RX ORDER — SODIUM CHLORIDE, SODIUM LACTATE, POTASSIUM CHLORIDE, CALCIUM CHLORIDE 600; 310; 30; 20 MG/100ML; MG/100ML; MG/100ML; MG/100ML
125 INJECTION, SOLUTION INTRAVENOUS CONTINUOUS
Status: DISCONTINUED | OUTPATIENT
Start: 2022-01-06 | End: 2022-01-09 | Stop reason: HOSPADM

## 2022-01-06 RX ORDER — ONDANSETRON 2 MG/ML
4 INJECTION INTRAMUSCULAR; INTRAVENOUS ONCE AS NEEDED
Status: DISCONTINUED | OUTPATIENT
Start: 2022-01-06 | End: 2022-01-09 | Stop reason: HOSPADM

## 2022-01-06 RX ORDER — MORPHINE SULFATE 0.5 MG/ML
INJECTION, SOLUTION EPIDURAL; INTRATHECAL; INTRAVENOUS AS NEEDED
Status: DISCONTINUED | OUTPATIENT
Start: 2022-01-06 | End: 2022-01-06 | Stop reason: SURG

## 2022-01-06 RX ORDER — PRENATAL VIT/IRON FUM/FOLIC AC 27MG-0.8MG
1 TABLET ORAL DAILY
COMMUNITY

## 2022-01-06 RX ORDER — ONDANSETRON 4 MG/1
4 TABLET, FILM COATED ORAL EVERY 6 HOURS PRN
Status: DISCONTINUED | OUTPATIENT
Start: 2022-01-06 | End: 2022-01-09 | Stop reason: HOSPADM

## 2022-01-06 RX ORDER — PRENATAL VIT/IRON FUM/FOLIC AC 27MG-0.8MG
1 TABLET ORAL DAILY
Status: DISCONTINUED | OUTPATIENT
Start: 2022-01-07 | End: 2022-01-09 | Stop reason: HOSPADM

## 2022-01-06 RX ORDER — OXYTOCIN-SODIUM CHLORIDE 0.9% IV SOLN 30 UNIT/500ML 30-0.9/5 UT/ML-%
125 SOLUTION INTRAVENOUS CONTINUOUS PRN
Status: DISCONTINUED | OUTPATIENT
Start: 2022-01-06 | End: 2022-01-06 | Stop reason: HOSPADM

## 2022-01-06 RX ORDER — OXYTOCIN-SODIUM CHLORIDE 0.9% IV SOLN 30 UNIT/500ML 30-0.9/5 UT/ML-%
999 SOLUTION INTRAVENOUS ONCE
Status: COMPLETED | OUTPATIENT
Start: 2022-01-06 | End: 2022-01-06

## 2022-01-06 RX ORDER — MIDAZOLAM HYDROCHLORIDE 1 MG/ML
1 INJECTION INTRAMUSCULAR; INTRAVENOUS
Status: DISCONTINUED | OUTPATIENT
Start: 2022-01-06 | End: 2022-01-09 | Stop reason: HOSPADM

## 2022-01-06 RX ORDER — DIPHENHYDRAMINE HYDROCHLORIDE 50 MG/ML
25 INJECTION INTRAMUSCULAR; INTRAVENOUS EVERY 4 HOURS PRN
Status: DISCONTINUED | OUTPATIENT
Start: 2022-01-06 | End: 2022-01-09 | Stop reason: HOSPADM

## 2022-01-06 RX ORDER — ONDANSETRON 4 MG/1
4 TABLET, FILM COATED ORAL EVERY 6 HOURS PRN
Status: DISCONTINUED | OUTPATIENT
Start: 2022-01-06 | End: 2022-01-06 | Stop reason: HOSPADM

## 2022-01-06 RX ORDER — PROMETHAZINE HYDROCHLORIDE 12.5 MG/1
12.5 SUPPOSITORY RECTAL EVERY 6 HOURS PRN
Status: DISCONTINUED | OUTPATIENT
Start: 2022-01-06 | End: 2022-01-06 | Stop reason: HOSPADM

## 2022-01-06 RX ORDER — SODIUM CHLORIDE 0.9 % (FLUSH) 0.9 %
3-10 SYRINGE (ML) INJECTION AS NEEDED
Status: DISCONTINUED | OUTPATIENT
Start: 2022-01-06 | End: 2022-01-09 | Stop reason: HOSPADM

## 2022-01-06 RX ORDER — SODIUM CHLORIDE, SODIUM LACTATE, POTASSIUM CHLORIDE, CALCIUM CHLORIDE 600; 310; 30; 20 MG/100ML; MG/100ML; MG/100ML; MG/100ML
125 INJECTION, SOLUTION INTRAVENOUS ONCE
Status: COMPLETED | OUTPATIENT
Start: 2022-01-06 | End: 2022-01-06

## 2022-01-06 RX ORDER — NALOXONE HCL 0.4 MG/ML
0.1 VIAL (ML) INJECTION
Status: ACTIVE | OUTPATIENT
Start: 2022-01-06 | End: 2022-01-07

## 2022-01-06 RX ORDER — PHENYLEPHRINE HYDROCHLORIDE 10 MG/ML
INJECTION INTRAVENOUS AS NEEDED
Status: DISCONTINUED | OUTPATIENT
Start: 2022-01-06 | End: 2022-01-06 | Stop reason: SURG

## 2022-01-06 RX ORDER — KETOROLAC TROMETHAMINE 30 MG/ML
30 INJECTION, SOLUTION INTRAMUSCULAR; INTRAVENOUS EVERY 6 HOURS PRN
Status: DISPENSED | OUTPATIENT
Start: 2022-01-06 | End: 2022-01-08

## 2022-01-06 RX ORDER — CARBOPROST TROMETHAMINE 250 UG/ML
250 INJECTION, SOLUTION INTRAMUSCULAR AS NEEDED
Status: DISCONTINUED | OUTPATIENT
Start: 2022-01-06 | End: 2022-01-06 | Stop reason: HOSPADM

## 2022-01-06 RX ORDER — MAGNESIUM HYDROXIDE 1200 MG/15ML
3000 LIQUID ORAL ONCE AS NEEDED
Status: DISCONTINUED | OUTPATIENT
Start: 2022-01-06 | End: 2022-01-06 | Stop reason: HOSPADM

## 2022-01-06 RX ORDER — METHYLERGONOVINE MALEATE 0.2 MG/ML
200 INJECTION INTRAVENOUS AS NEEDED
Status: DISCONTINUED | OUTPATIENT
Start: 2022-01-06 | End: 2022-01-06 | Stop reason: HOSPADM

## 2022-01-06 RX ORDER — DOCUSATE SODIUM 100 MG/1
100 CAPSULE, LIQUID FILLED ORAL 2 TIMES DAILY PRN
Status: DISCONTINUED | OUTPATIENT
Start: 2022-01-07 | End: 2022-01-09 | Stop reason: HOSPADM

## 2022-01-06 RX ORDER — SODIUM CHLORIDE 0.9 % (FLUSH) 0.9 %
10 SYRINGE (ML) INJECTION EVERY 12 HOURS SCHEDULED
Status: DISCONTINUED | OUTPATIENT
Start: 2022-01-06 | End: 2022-01-09 | Stop reason: HOSPADM

## 2022-01-06 RX ORDER — OXYCODONE HYDROCHLORIDE AND ACETAMINOPHEN 5; 325 MG/1; MG/1
1 TABLET ORAL EVERY 4 HOURS PRN
Status: DISCONTINUED | OUTPATIENT
Start: 2022-01-06 | End: 2022-01-09 | Stop reason: HOSPADM

## 2022-01-06 RX ORDER — SODIUM CHLORIDE 0.9 % (FLUSH) 0.9 %
10 SYRINGE (ML) INJECTION AS NEEDED
Status: DISCONTINUED | OUTPATIENT
Start: 2022-01-06 | End: 2022-01-09 | Stop reason: HOSPADM

## 2022-01-06 RX ORDER — METHYLERGONOVINE MALEATE 0.2 MG/ML
200 INJECTION INTRAVENOUS ONCE AS NEEDED
Status: DISCONTINUED | OUTPATIENT
Start: 2022-01-06 | End: 2022-01-09 | Stop reason: HOSPADM

## 2022-01-06 RX ORDER — SODIUM CHLORIDE 0.9 % (FLUSH) 0.9 %
3-10 SYRINGE (ML) INJECTION AS NEEDED
Status: DISCONTINUED | OUTPATIENT
Start: 2022-01-06 | End: 2022-01-06 | Stop reason: HOSPADM

## 2022-01-06 RX ORDER — MISOPROSTOL 100 UG/1
600 TABLET ORAL AS NEEDED
Status: DISCONTINUED | OUTPATIENT
Start: 2022-01-06 | End: 2022-01-06 | Stop reason: HOSPADM

## 2022-01-06 RX ORDER — ONDANSETRON 2 MG/ML
4 INJECTION INTRAMUSCULAR; INTRAVENOUS EVERY 6 HOURS PRN
Status: DISCONTINUED | OUTPATIENT
Start: 2022-01-06 | End: 2022-01-06 | Stop reason: HOSPADM

## 2022-01-06 RX ORDER — OXYCODONE AND ACETAMINOPHEN 10; 325 MG/1; MG/1
1 TABLET ORAL EVERY 4 HOURS PRN
Status: DISCONTINUED | OUTPATIENT
Start: 2022-01-06 | End: 2022-01-09 | Stop reason: HOSPADM

## 2022-01-06 RX ORDER — IBUPROFEN 800 MG/1
800 TABLET ORAL EVERY 8 HOURS SCHEDULED
Status: DISCONTINUED | OUTPATIENT
Start: 2022-01-06 | End: 2022-01-09 | Stop reason: HOSPADM

## 2022-01-06 RX ORDER — HYDROXYZINE 50 MG/1
50 TABLET, FILM COATED ORAL EVERY 6 HOURS PRN
Status: DISCONTINUED | OUTPATIENT
Start: 2022-01-06 | End: 2022-01-09 | Stop reason: HOSPADM

## 2022-01-06 RX ADMIN — SODIUM CHLORIDE, POTASSIUM CHLORIDE, SODIUM LACTATE AND CALCIUM CHLORIDE 125 ML/HR: 600; 310; 30; 20 INJECTION, SOLUTION INTRAVENOUS at 08:08

## 2022-01-06 RX ADMIN — METOPROLOL TARTRATE 12.5 MG: 25 TABLET, FILM COATED ORAL at 22:17

## 2022-01-06 RX ADMIN — SODIUM CHLORIDE, POTASSIUM CHLORIDE, SODIUM LACTATE AND CALCIUM CHLORIDE: 600; 310; 30; 20 INJECTION, SOLUTION INTRAVENOUS at 08:16

## 2022-01-06 RX ADMIN — SODIUM CHLORIDE, POTASSIUM CHLORIDE, SODIUM LACTATE AND CALCIUM CHLORIDE 125 ML/HR: 600; 310; 30; 20 INJECTION, SOLUTION INTRAVENOUS at 11:39

## 2022-01-06 RX ADMIN — SODIUM CHLORIDE, POTASSIUM CHLORIDE, SODIUM LACTATE AND CALCIUM CHLORIDE 1000 ML: 600; 310; 30; 20 INJECTION, SOLUTION INTRAVENOUS at 06:20

## 2022-01-06 RX ADMIN — MORPHINE SULFATE 0.3 MG: 0.5 INJECTION EPIDURAL; INTRATHECAL; INTRAVENOUS at 08:20

## 2022-01-06 RX ADMIN — SODIUM CHLORIDE, PRESERVATIVE FREE 10 ML: 5 INJECTION INTRAVENOUS at 22:21

## 2022-01-06 RX ADMIN — IBUPROFEN 800 MG: 800 TABLET, FILM COATED ORAL at 22:17

## 2022-01-06 RX ADMIN — BUPIVACAINE HYDROCHLORIDE 1.8 ML: 7.5 INJECTION, SOLUTION EPIDURAL; RETROBULBAR at 08:20

## 2022-01-06 RX ADMIN — FENTANYL CITRATE 25 MCG: 50 INJECTION, SOLUTION INTRAMUSCULAR; INTRAVENOUS at 08:20

## 2022-01-06 RX ADMIN — ONDANSETRON 4 MG: 2 INJECTION INTRAMUSCULAR; INTRAVENOUS at 11:45

## 2022-01-06 RX ADMIN — KETOROLAC TROMETHAMINE 30 MG: 30 INJECTION, SOLUTION INTRAMUSCULAR at 11:44

## 2022-01-06 RX ADMIN — PHENYLEPHRINE HYDROCHLORIDE 100 MCG: 10 INJECTION INTRAVENOUS at 08:30

## 2022-01-06 RX ADMIN — OXYCODONE HYDROCHLORIDE AND ACETAMINOPHEN 1 TABLET: 5; 325 TABLET ORAL at 22:20

## 2022-01-06 RX ADMIN — OXYTOCIN-SODIUM CHLORIDE 0.9% IV SOLN 30 UNIT/500ML 999 ML/HR: 30-0.9/5 SOLUTION at 08:34

## 2022-01-06 NOTE — ANESTHESIA PREPROCEDURE EVALUATION
Anesthesia Evaluation     Patient summary reviewed and Nursing notes reviewed   no history of anesthetic complications:  NPO Solid Status: > 8 hours  NPO Liquid Status: > 8 hours           Airway   Mallampati: II  TM distance: >3 FB  Neck ROM: full  No difficulty expected  Dental - normal exam     Pulmonary - negative pulmonary ROS and normal exam   Cardiovascular - negative cardio ROS and normal exam  Exercise tolerance: good (4-7 METS)    NYHA Classification: II        Neuro/Psych- negative ROS  GI/Hepatic/Renal/Endo    (+) obesity,       Musculoskeletal (-) negative ROS    Abdominal  - normal exam    Bowel sounds: normal.   Substance History - negative use     OB/GYN    (+) Pregnant,         Other - negative ROS                       Anesthesia Plan    ASA 3     spinal and ITN       Anesthetic plan, all risks, benefits, and alternatives have been provided, discussed and informed consent has been obtained with: patient.       Dosing Month 1 (Required For Cumulative Dosing): 20mg Daily

## 2022-01-06 NOTE — PLAN OF CARE
Goal Outcome Evaluation:  Plan of Care Reviewed With: patient        Progress: improving  Outcome Summary: Patient recieved from L&D s/p repeat ; low transverse incision clean, dry and intact. Fundus firm. Light bleeding noted. Sorto catheter in place. Patient bonding well with infant. No complaints or acute changes at this time. VSS.

## 2022-01-06 NOTE — OP NOTE
Repeat Low Transverse  Section Operation Note    Yael Robbins  2022  Gestational Age-39.1 weeks    Pre-op Diagnosis:   REPEAT  SECTION    Post-op Diagnosis:     Same  Refused     Procedure(s):   SECTION REPEAT     Surgeon(s):  Cathleen Houston DO    Anesthesia: Spinal    Staff:   Circulator: Ember Koehler RN  Scrub Person: Litzy Cano[place] Kristy  Baby Nurse: Rosalie Luke RN; Dorys Mcgrath RN  Assistant: Selena Murdock    Estimated Blood Loss: 500cc    Time: 833    Grafts/Implants: None    Procedure     The patient was prepped and draped in the normal sterile fashion. A Pfannenstiel skin incision was made with the scalpel and carried down through the underlying fascia. The fascia was nicked in the midline and extended laterally. The peritoneum was entered. The bladder blade was placed. The uterine incision was made with the scalpel. The infant was delivered in atraumatic fashion. The nares and mouth was bulb suctioned. Cord was clamped times 2 and cut. The placenta was delivered intact. The uterus was closed with 2 layers of #0 chromic in a running locking fashion. The fascia was closed with 0 Vicryl. The skin was closed with 4-0 Monocryl and Dermabond. The patient tolerated the procedure well and went to the recovery room in stable condition.     Assistant: Selena Murdock  was responsible for performing the following activities: Retraction, Suction and Placing Dressing and their skilled assistance was necessary for the success of this case.     APGARS  8   8          GENDER  Male        Cathleen Houston DO     Date: 2022  Time: 15:24 EST

## 2022-01-06 NOTE — PAYOR COMM NOTE
"CONTACT:  SAMIA TRUJILLO MSN, APRN  UTILIZATION MANAGEMENT DEPT.  [facility]  [facility], [address]  PHONE:  [phone]  FAX: [phone]    DELIVERY INFORMATION    REFERENCE # [id number]    Mabel Alejandre (31 y.o. Female)             Date of Birth Social Security Number Address Home Phone MRN    [id number]  [id number]  [place] [place] [place] [address] [phone] [id number]    Yazidism Marital Status             None        Admission Date Admission Type Admitting Provider Attending Provider Department, Room/Bed    22 Elective Cathleen Houston DO Thompson, Misty Danielle, DO [facility], [address]    Discharge Date Discharge Disposition Discharge Destination                         Attending Provider: Cathleen Houston DO    Allergies: No Known Allergies    Isolation: None   Infection: None   Code Status: CPR   Advance Care Planning Activity    Ht: 170.2 cm (67\")   Wt: 105 kg (231 lb)    Admission Cmt: None   Principal Problem: None                Active Insurance as of 2022     Primary Coverage     Payor Plan Insurance Group Employer/Plan Group    [facility] COMMUNITY PLAN [facility] COMMUNITY PLAN [facility]     Payor Plan Address Payor Plan Phone Number Payor Plan Fax Number Effective Dates    [address]   2021 - None Entered    [facility] [address]       Subscriber Name Subscriber Birth Date Member ID       MABEL ALEJANDRE 1990 [id number]                 Emergency Contacts      (Rel.) Home Phone Work Phone Mobile Phone    CATARINA ALEJANDRE (Spouse) [phone] -- --        DELIVERY INFORMATION:    ADMIT DATE: 22    DELIVERY DATE AND TIME: 22 @ 0833    DELIVERY TYPE:     GENDER OF BABY: MALE    WEIGHT:  4065 GRAMS    APGARS:  8/8    NURSERY TYPE: WELL BABY    GESTATIONAL AGE: 39/1    EDC: 22    /PARA: 2/2       History & Physical      Cathleen Houston DO at 22 0726  "         WILLIE Becerra  Obstetric History and Physical    Chief Complaint   Patient presents with   • Scheduled      RCS       Subjective     Patient is a 31 y.o. female  currently at 39w1d, who presents for RLTCD.    Her prenatal care is benign.  Her previous obstetric/gynecological history is noted for is non-contributory.    The following portions of the patients history were reviewed and updated as appropriate: current medications, allergies, past medical history, past surgical history, past family history, past social history and problem list .   Social History     Socioeconomic History   • Marital status:      Spouse name: CATARINA ALEJANDRE   • Number of children: 1   • Highest education level: Some college, no degree   Tobacco Use   • Smoking status: Never Smoker   • Smokeless tobacco: Never Used   Vaping Use   • Vaping Use: Never used   Substance and Sexual Activity   • Alcohol use: Never   • Drug use: Never   • Sexual activity: Defer     Partners: Male     Past Medical History:   Diagnosis Date   • Tachycardia        Current Facility-Administered Medications:   •  carboprost (HEMABATE) injection 250 mcg, 250 mcg, Intramuscular, PRN, Karyn Barrientos APRN  •  ceFAZolin Sodium-Dextrose (ANCEF) IVPB (duplex) 2 g, 2 g, Intravenous, Once, Karyn Barrientos APRN  •  lactated ringers infusion, 125 mL/hr, Intravenous, Continuous, Karyn Barrientos APRN  •  lactated ringers infusion, 125 mL/hr, Intravenous, Once, Edmar Sharp MD  •  methylergonovine (METHERGINE) injection 200 mcg, 200 mcg, Intramuscular, PRN, Karyn Barrientos APRN  •  midazolam (VERSED) injection 1 mg, 1 mg, Intravenous, Q10 Min PRN, Edmar Sharp MD  •  miSOPROStol (CYTOTEC) tablet 600 mcg, 600 mcg, Rectal, PRN, Karyn Barrientos APRN  •  oxytocin in sodium chloride (PITOCIN) 30 UNIT/500ML infusion solution  - ADS Override Pull, , , ,   •  oxytocin in sodium chloride (PITOCIN) 30 UNIT/500ML infusion  solution, 999 mL/hr, Intravenous, Once **FOLLOWED BY** oxytocin in sodium chloride (PITOCIN) 30 UNIT/500ML infusion solution, 250 mL/hr, Intravenous, Continuous **FOLLOWED BY** oxytocin in sodium chloride (PITOCIN) 30 UNIT/500ML infusion solution, 125 mL/hr, Intravenous, Continuous PRN, Karyn Barrientos APRN  •  sodium chloride (NS) irrigation solution 3,000 mL, 3,000 mL, Irrigation, Once PRN, Karyn Barrientos APRN  •  sodium chloride 0.9 % flush 10 mL, 10 mL, Intravenous, Q12H, Edmar Sharp MD  •  sodium chloride 0.9 % flush 10 mL, 10 mL, Intravenous, PRN, Edmar Sharp MD  •  sodium chloride 0.9 % flush 3-10 mL, 3-10 mL, Intravenous, PRN, Karyn Barrientos APRN  No Known Allergies  Past Surgical History:   Procedure Laterality Date   •  SECTION           Prenatal Information:   Maternal Prenatal Labs  Blood Type ABO Type   Date Value Ref Range Status   2022 B  Final      Rh Status RH type   Date Value Ref Range Status   2022 Positive  Final      Antibody Screen Antibody Screen   Date Value Ref Range Status   2022 Negative  Final      Rapid Urine Drug Screen Barbiturates Screen, Urine   Date Value Ref Range Status   2022 Negative Negative Final     Benzodiazepine Screen, Urine   Date Value Ref Range Status   2022 Negative Negative Final     Methadone Screen, Urine   Date Value Ref Range Status   2022 Negative Negative Final     Opiate Screen   Date Value Ref Range Status   2022 Negative Negative Final     THC, Screen, Urine   Date Value Ref Range Status   2022 Negative Negative Final     Cocaine Screen, Urine   Date Value Ref Range Status   2022 Negative Negative Final     Amphetamine Screen, Urine   Date Value Ref Range Status   2022 Negative Negative Final     Propoxyphene Screen   Date Value Ref Range Status   2022 Negative Negative Final     Buprenorphine, Screen, Urine   Date Value Ref Range Status   2022  Negative Negative Final     Methamphetamine, Ur   Date Value Ref Range Status   01/06/2022 Negative Negative Final     Oxycodone Screen, Urine   Date Value Ref Range Status   01/06/2022 Negative Negative Final     Tricyclic Antidepressants Screen   Date Value Ref Range Status   01/06/2022 Negative Negative Final      Group B Strep Culture No results found for: GBSANTIGEN           External Prenatal Results     Pregnancy Outside Results - Transcribed From Office Records - See Scanned Records For Details     Test Value Date Time    ABO  B  01/06/22 0610    Rh  Positive  01/06/22 0610    Antibody Screen  Negative  01/06/22 0610      ^ Negative  06/29/21     Varicella IgG       Rubella ^ Immune  06/29/21     Hgb  11.6 g/dL 01/06/22 0610       12.3 g/dL 08/17/21 1142    Hct  36.3 % 01/06/22 0610       37.3 % 08/17/21 1142    Glucose Fasting GTT       Glucose Tolerance Test 1 hour       Glucose Tolerance Test 3 hour       Gonorrhea (discrete) ^ Negative  06/29/21     Chlamydia (discrete) ^ Negative  06/29/21     RPR ^ Non-Reactive  06/29/21     VDRL       Syphilis Antibody       HBsAg ^ Negative  06/29/21     Herpes Simplex Virus PCR       Herpes Simplex VIrus Culture       HIV ^ Non-Reactive  06/29/21     Hep C RNA Quant PCR       Hep C Antibody       AFP       Group B Strep ^ Positive  12/16/21     GBS Susceptibility to Clindamycin       GBS Susceptibility to Erythromycin       Fetal Fibronectin       Genetic Testing, Maternal Blood             Drug Screening     Test Value Date Time    Urine Drug Screen       Amphetamine Screen  Negative  01/06/22 0611    Barbiturate Screen  Negative  01/06/22 0611    Benzodiazepine Screen  Negative  01/06/22 0611    Methadone Screen  Negative  01/06/22 0611    Phencyclidine Screen  Negative  01/06/22 0611    Opiates Screen  Negative  01/06/22 0611    THC Screen  Negative  01/06/22 0611    Cocaine Screen       Propoxyphene Screen  Negative  01/06/22 0611    Buprenorphine Screen   Negative  22    Methamphetamine Screen       Oxycodone Screen  Negative  22    Tricyclic Antidepressants Screen  Negative  22          Legend    ^: Historical                          Past OB History:     OB History    Para Term  AB Living   2 1 1 0 0 1   SAB IAB Ectopic Molar Multiple Live Births   0 0 0 0 0 1      # Outcome Date GA Lbr Du/2nd Weight Sex Delivery Anes PTL Lv   2 Current            1 Term     M CS-LTranv   [facility]       Past Medical History: Past Medical History:   Diagnosis Date   • Tachycardia       Past Surgical History Past Surgical History:   Procedure Laterality Date   •  SECTION        Family History: Family History   Problem Relation Age of Onset   • No Known Problems Mother    • Hypertension Father    • Leukemia Sister       Social History:  reports that she has never smoked. She has never used smokeless tobacco.   reports no history of alcohol use.   reports no history of drug use.        Review of Systems      Objective     Vital Signs Range for the last 24 hours  Temperature: Temp:  [98.2 °F (36.8 °C)] 98.2 °F (36.8 °C)   Temp Source: Temp src: Oral   BP: BP: (140)/(76) 140/76   Pulse: Heart Rate:  [101] 101   Respirations: Resp:  [18] 18   Weight: Weight:  [105 kg (231 lb)] 105 kg (231 lb)     Physical Examination: General appearance - alert, well appearing, and in no distress, oriented to person, place, and time, normal appearing weight and well hydrated  Mental status - alert, oriented to person, place, and time, normal mood, behavior, speech, dress, motor activity, and thought processes, affect appropriate to mood  Neck - supple, no significant adenopathy  Chest - clear to auscultation, no wheezes, rales or rhonchi, symmetric air entry, no tachypnea, retractions or cyanosis  Heart - normal rate, regular rhythm, normal S1, S2, no murmurs, rubs, clicks or gallops  Abdomen - soft, nontender, gravid uterus, no masses or organomegaly  no  rebound tenderness noted,   Pelvic - normal external genitalia, vulva, vagina, cervix, uterus and adnexa  Neurological - alert, oriented, normal speech, no focal findings or movement disorder noted  Musculoskeletal - no joint tenderness, deformity or swelling  Extremities - peripheral pulses normal, no pedal edema, no clubbing or cyanosis  Skin - normal coloration and turgor, no rashes, no suspicious skin lesions noted        Cervix: Exam by:     Dilation:     Effacement:     Station:       Laboratory Results:   Lab Results (last 24 hours)     Procedure Component Value Units Date/Time    Hepatitis B Surface Antigen [[id number]] Resulted: 06/29/21    Specimen: Blood Updated: 01/06/22 0658     External Hepatitis B Surface Ag Negative    RPR [[id number]] Resulted: 06/29/21    Specimen: Blood Updated: 01/06/22 0658     External RPR Non-Reactive    Rubella Antibody, IgG [[id number]] Resulted: 06/29/21    Specimen: Blood Updated: 01/06/22 0658     External Rubella Qual Immune    HIV-1 Antibody, EIA [[id number]] Resulted: 06/29/21    Specimen: Blood Updated: 01/06/22 0658     External HIV Antibody Non-Reactive    Chlamydia trachomatis, Neisseria gonorrhoeae, PCR w/ confirmation - Swab, Vagina [[id number]] Resulted: 06/29/21    Specimen: Swab from Vagina Updated: 01/06/22 0658     External Chlamydia Screen Negative    Gonorrhea Screen - Swab, [[id number]] Resulted: 06/29/21    Specimen: Swab Updated: 01/06/22 0658     External Gonorrhea Screen Negative    Group B Streptococcus Culture - Swab, Vaginal/Rectum [[id number]] Resulted: 12/16/21    Specimen: Swab from Vaginal/Rectum Updated: 01/06/22 0658     External Strep Group B Ag Positive    Urine Drug Screen - Urine, Clean Catch [[id number]]  (Normal) Collected: 01/06/22 0611    Specimen: Urine, Clean Catch Updated: 01/06/22 0630     THC, Screen, Urine Negative     Phencyclidine (PCP), Urine Negative     Cocaine Screen, Urine Negative     Methamphetamine, Ur Negative     Opiate  Screen Negative     Amphetamine Screen, Urine Negative     Benzodiazepine Screen, Urine Negative     Tricyclic Antidepressants Screen Negative     Methadone Screen, Urine Negative     Barbiturates Screen, Urine Negative     Oxycodone Screen, Urine Negative     Propoxyphene Screen Negative     Buprenorphine, Screen, Urine Negative    Narrative:      Cutoff For Drugs Screened:    Amphetamines               500 ng/ml  Barbiturates               200 ng/ml  Benzodiazepines            150 ng/ml  Cocaine                    150 ng/ml  Methadone                  200 ng/ml  Opiates                    100 ng/ml  Phencyclidine               25 ng/ml  THC                            50 ng/ml  Methamphetamine            500 ng/ml  Tricyclic Antidepressants  300 ng/ml  Oxycodone                  100 ng/ml  Propoxyphene               300 ng/ml  Buprenorphine               10 ng/ml    The normal value for all drugs tested is negative. This report includes unconfirmed screening results, with the cutoff values listed, to be used for medical treatment purposes only.  Unconfirmed results must not be used for non-medical purposes such as employment or legal testing.  Clinical consideration should be applied to any drug of abuse test, particularly when unconfirmed results are used.      CBC & Differential [[id number]]  (Abnormal) Collected: 01/06/22 0610    Specimen: Blood Updated: 01/06/22 0620    Narrative:      The following orders were created for panel order CBC & Differential.  Procedure                               Abnormality         Status                     ---------                               -----------         ------                     CBC Auto Differential[[id number]]        Abnormal            Final result                 Please view results for these tests on the individual orders.    CBC Auto Differential [[id number]]  (Abnormal) Collected: 01/06/22 0610    Specimen: Blood Updated: 01/06/22 0620     WBC 9.19 10*3/mm3       RBC 4.29 10*6/mm3      Hemoglobin 11.6 g/dL      Hematocrit 36.3 %      MCV 84.6 fL      MCH 27.0 pg      MCHC 32.0 g/dL      RDW 13.3 %      RDW-SD 41.3 fl      MPV 10.5 fL      Platelets 234 10*3/mm3      Neutrophil % 65.0 %      Lymphocyte % 21.9 %      Monocyte % 10.3 %      Eosinophil % 1.2 %      Basophil % 0.4 %      Immature Grans % 1.2 %      Neutrophils, Absolute 5.97 10*3/mm3      Lymphocytes, Absolute 2.01 10*3/mm3      Monocytes, Absolute 0.95 10*3/mm3      Eosinophils, Absolute 0.11 10*3/mm3      Basophils, Absolute 0.04 10*3/mm3      Immature Grans, Absolute 0.11 10*3/mm3      nRBC 0.0 /100 WBC         Radiology Review:   Imaging Results (Last 72 Hours)     ** No results found for the last 72 hours. **            Assessment/Plan       Pregnancy      Assessment & Plan    Assessment:  1.  Intrauterine pregnancy at 39w1d weeks gestation with reassuring fetal status.    2.  H/o C/S x 1, desires repeat  3.  Obstetrical history significant for is non-contributory.  4.  GBS status: No results found for: GBSANTIGEN    Plan:  1. Proceed with RLTCD  2. Plan of care has been reviewed with patient   3.  Risks, benefits of treatment plan have been discussed.  4.  All questions have been answered.        Cathleen Houston DO  1/6/2022  07:26 EST      Electronically signed by Cathleen Houston DO at 01/06/22 0727     H&P signed by [employer], [employer] at 01/06/22 0823      Scan on 1/6/2022 by [employer], [employer]: H&P, [facility], 01/05/2022          Electronically signed by [employer], [employer] at 01/06/22 0823       Operative/Procedure Notes (all)    No notes of this type exist for this encounter.

## 2022-01-06 NOTE — H&P
WILLIE Becerra  Obstetric History and Physical    Chief Complaint   Patient presents with   • Scheduled      RCS       Subjective     Patient is a 31 y.o. female  currently at 39w1d, who presents for RLTCD.    Her prenatal care is benign.  Her previous obstetric/gynecological history is noted for is non-contributory.    The following portions of the patients history were reviewed and updated as appropriate: current medications, allergies, past medical history, past surgical history, past family history, past social history and problem list .   Social History     Socioeconomic History   • Marital status:      Spouse name: CATARINA ALEJANDRE   • Number of children: 1   • Highest education level: Some college, no degree   Tobacco Use   • Smoking status: Never Smoker   • Smokeless tobacco: Never Used   Vaping Use   • Vaping Use: Never used   Substance and Sexual Activity   • Alcohol use: Never   • Drug use: Never   • Sexual activity: Defer     Partners: Male     Past Medical History:   Diagnosis Date   • Tachycardia        Current Facility-Administered Medications:   •  carboprost (HEMABATE) injection 250 mcg, 250 mcg, Intramuscular, PRN, Karyn Barrientos APRN  •  ceFAZolin Sodium-Dextrose (ANCEF) IVPB (duplex) 2 g, 2 g, Intravenous, Once, Karyn Barrientos APRN  •  lactated ringers infusion, 125 mL/hr, Intravenous, Continuous, Karyn Barrientos APRN  •  lactated ringers infusion, 125 mL/hr, Intravenous, Once, Edmar Sharp MD  •  methylergonovine (METHERGINE) injection 200 mcg, 200 mcg, Intramuscular, PRN, Karyn Barrientos APRN  •  midazolam (VERSED) injection 1 mg, 1 mg, Intravenous, Q10 Min PRN, Edmar Sharp MD  •  miSOPROStol (CYTOTEC) tablet 600 mcg, 600 mcg, Rectal, PRN, Karyn Barrientos APRN  •  oxytocin in sodium chloride (PITOCIN) 30 UNIT/500ML infusion solution  - ADS Override Pull, , , ,   •  oxytocin in sodium chloride (PITOCIN) 30 UNIT/500ML infusion solution, 999  mL/hr, Intravenous, Once **FOLLOWED BY** oxytocin in sodium chloride (PITOCIN) 30 UNIT/500ML infusion solution, 250 mL/hr, Intravenous, Continuous **FOLLOWED BY** oxytocin in sodium chloride (PITOCIN) 30 UNIT/500ML infusion solution, 125 mL/hr, Intravenous, Continuous PRN, Karyn Barrientos APRN  •  sodium chloride (NS) irrigation solution 3,000 mL, 3,000 mL, Irrigation, Once PRN, Karyn Barrientos APRN  •  sodium chloride 0.9 % flush 10 mL, 10 mL, Intravenous, Q12H, Edmar Sharp MD  •  sodium chloride 0.9 % flush 10 mL, 10 mL, Intravenous, PRN, Edmar Sharp MD  •  sodium chloride 0.9 % flush 3-10 mL, 3-10 mL, Intravenous, PRN, Karyn Barrientos APRN  No Known Allergies  Past Surgical History:   Procedure Laterality Date   •  SECTION           Prenatal Information:   Maternal Prenatal Labs  Blood Type ABO Type   Date Value Ref Range Status   2022 B  Final      Rh Status RH type   Date Value Ref Range Status   2022 Positive  Final      Antibody Screen Antibody Screen   Date Value Ref Range Status   2022 Negative  Final      Rapid Urine Drug Screen Barbiturates Screen, Urine   Date Value Ref Range Status   2022 Negative Negative Final     Benzodiazepine Screen, Urine   Date Value Ref Range Status   2022 Negative Negative Final     Methadone Screen, Urine   Date Value Ref Range Status   2022 Negative Negative Final     Opiate Screen   Date Value Ref Range Status   2022 Negative Negative Final     THC, Screen, Urine   Date Value Ref Range Status   2022 Negative Negative Final     Cocaine Screen, Urine   Date Value Ref Range Status   2022 Negative Negative Final     Amphetamine Screen, Urine   Date Value Ref Range Status   2022 Negative Negative Final     Propoxyphene Screen   Date Value Ref Range Status   2022 Negative Negative Final     Buprenorphine, Screen, Urine   Date Value Ref Range Status   2022 Negative Negative  Final     Methamphetamine, Ur   Date Value Ref Range Status   01/06/2022 Negative Negative Final     Oxycodone Screen, Urine   Date Value Ref Range Status   01/06/2022 Negative Negative Final     Tricyclic Antidepressants Screen   Date Value Ref Range Status   01/06/2022 Negative Negative Final      Group B Strep Culture No results found for: GBSANTIGEN           External Prenatal Results     Pregnancy Outside Results - Transcribed From Office Records - See Scanned Records For Details     Test Value Date Time    ABO  B  01/06/22 0610    Rh  Positive  01/06/22 0610    Antibody Screen  Negative  01/06/22 0610      ^ Negative  06/29/21     Varicella IgG       Rubella ^ Immune  06/29/21     Hgb  11.6 g/dL 01/06/22 0610       12.3 g/dL 08/17/21 1142    Hct  36.3 % 01/06/22 0610       37.3 % 08/17/21 1142    Glucose Fasting GTT       Glucose Tolerance Test 1 hour       Glucose Tolerance Test 3 hour       Gonorrhea (discrete) ^ Negative  06/29/21     Chlamydia (discrete) ^ Negative  06/29/21     RPR ^ Non-Reactive  06/29/21     VDRL       Syphilis Antibody       HBsAg ^ Negative  06/29/21     Herpes Simplex Virus PCR       Herpes Simplex VIrus Culture       HIV ^ Non-Reactive  06/29/21     Hep C RNA Quant PCR       Hep C Antibody       AFP       Group B Strep ^ Positive  12/16/21     GBS Susceptibility to Clindamycin       GBS Susceptibility to Erythromycin       Fetal Fibronectin       Genetic Testing, Maternal Blood             Drug Screening     Test Value Date Time    Urine Drug Screen       Amphetamine Screen  Negative  01/06/22 0611    Barbiturate Screen  Negative  01/06/22 0611    Benzodiazepine Screen  Negative  01/06/22 0611    Methadone Screen  Negative  01/06/22 0611    Phencyclidine Screen  Negative  01/06/22 0611    Opiates Screen  Negative  01/06/22 0611    THC Screen  Negative  01/06/22 0611    Cocaine Screen       Propoxyphene Screen  Negative  01/06/22 0611    Buprenorphine Screen  Negative  01/06/22 0611     Methamphetamine Screen       Oxycodone Screen  Negative  22    Tricyclic Antidepressants Screen  Negative  22          Legend    ^: Historical                          Past OB History:     OB History    Para Term  AB Living   2 1 1 0 0 1   SAB IAB Ectopic Molar Multiple Live Births   0 0 0 0 0 1      # Outcome Date GA Lbr Du/2nd Weight Sex Delivery Anes PTL Lv   2 Current            1 Term     M CS-LTranv   [facility]       Past Medical History: Past Medical History:   Diagnosis Date   • Tachycardia       Past Surgical History Past Surgical History:   Procedure Laterality Date   •  SECTION        Family History: Family History   Problem Relation Age of Onset   • No Known Problems Mother    • Hypertension Father    • Leukemia Sister       Social History:  reports that she has never smoked. She has never used smokeless tobacco.   reports no history of alcohol use.   reports no history of drug use.        Review of Systems      Objective     Vital Signs Range for the last 24 hours  Temperature: Temp:  [98.2 °F (36.8 °C)] 98.2 °F (36.8 °C)   Temp Source: Temp src: Oral   BP: BP: (140)/(76) 140/76   Pulse: Heart Rate:  [101] 101   Respirations: Resp:  [18] 18   Weight: Weight:  [105 kg (231 lb)] 105 kg (231 lb)     Physical Examination: General appearance - alert, well appearing, and in no distress, oriented to person, place, and time, normal appearing weight and well hydrated  Mental status - alert, oriented to person, place, and time, normal mood, behavior, speech, dress, motor activity, and thought processes, affect appropriate to mood  Neck - supple, no significant adenopathy  Chest - clear to auscultation, no wheezes, rales or rhonchi, symmetric air entry, no tachypnea, retractions or cyanosis  Heart - normal rate, regular rhythm, normal S1, S2, no murmurs, rubs, clicks or gallops  Abdomen - soft, nontender, gravid uterus, no masses or organomegaly  no rebound tenderness  noted,   Pelvic - normal external genitalia, vulva, vagina, cervix, uterus and adnexa  Neurological - alert, oriented, normal speech, no focal findings or movement disorder noted  Musculoskeletal - no joint tenderness, deformity or swelling  Extremities - peripheral pulses normal, no pedal edema, no clubbing or cyanosis  Skin - normal coloration and turgor, no rashes, no suspicious skin lesions noted        Cervix: Exam by:     Dilation:     Effacement:     Station:       Laboratory Results:   Lab Results (last 24 hours)     Procedure Component Value Units Date/Time    Hepatitis B Surface Antigen [[id number]] Resulted: 06/29/21    Specimen: Blood Updated: 01/06/22 0658     External Hepatitis B Surface Ag Negative    RPR [[id number]] Resulted: 06/29/21    Specimen: Blood Updated: 01/06/22 0658     External RPR Non-Reactive    Rubella Antibody, IgG [[id number]] Resulted: 06/29/21    Specimen: Blood Updated: 01/06/22 0658     External Rubella Qual Immune    HIV-1 Antibody, EIA [[id number]] Resulted: 06/29/21    Specimen: Blood Updated: 01/06/22 0658     External HIV Antibody Non-Reactive    Chlamydia trachomatis, Neisseria gonorrhoeae, PCR w/ confirmation - Swab, Vagina [[id number]] Resulted: 06/29/21    Specimen: Swab from Vagina Updated: 01/06/22 0658     External Chlamydia Screen Negative    Gonorrhea Screen - Swab, [[id number]] Resulted: 06/29/21    Specimen: Swab Updated: 01/06/22 0658     External Gonorrhea Screen Negative    Group B Streptococcus Culture - Swab, Vaginal/Rectum [[id number]] Resulted: 12/16/21    Specimen: Swab from Vaginal/Rectum Updated: 01/06/22 0658     External Strep Group B Ag Positive    Urine Drug Screen - Urine, Clean Catch [[id number]]  (Normal) Collected: 01/06/22 0611    Specimen: Urine, Clean Catch Updated: 01/06/22 0630     THC, Screen, Urine Negative     Phencyclidine (PCP), Urine Negative     Cocaine Screen, Urine Negative     Methamphetamine, Ur Negative     Opiate Screen Negative      Amphetamine Screen, Urine Negative     Benzodiazepine Screen, Urine Negative     Tricyclic Antidepressants Screen Negative     Methadone Screen, Urine Negative     Barbiturates Screen, Urine Negative     Oxycodone Screen, Urine Negative     Propoxyphene Screen Negative     Buprenorphine, Screen, Urine Negative    Narrative:      Cutoff For Drugs Screened:    Amphetamines               500 ng/ml  Barbiturates               200 ng/ml  Benzodiazepines            150 ng/ml  Cocaine                    150 ng/ml  Methadone                  200 ng/ml  Opiates                    100 ng/ml  Phencyclidine               25 ng/ml  THC                            50 ng/ml  Methamphetamine            500 ng/ml  Tricyclic Antidepressants  300 ng/ml  Oxycodone                  100 ng/ml  Propoxyphene               300 ng/ml  Buprenorphine               10 ng/ml    The normal value for all drugs tested is negative. This report includes unconfirmed screening results, with the cutoff values listed, to be used for medical treatment purposes only.  Unconfirmed results must not be used for non-medical purposes such as employment or legal testing.  Clinical consideration should be applied to any drug of abuse test, particularly when unconfirmed results are used.      CBC & Differential [[id number]]  (Abnormal) Collected: 01/06/22 0610    Specimen: Blood Updated: 01/06/22 0620    Narrative:      The following orders were created for panel order CBC & Differential.  Procedure                               Abnormality         Status                     ---------                               -----------         ------                     CBC Auto Differential[[id number]]        Abnormal            Final result                 Please view results for these tests on the individual orders.    CBC Auto Differential [[id number]]  (Abnormal) Collected: 01/06/22 0610    Specimen: Blood Updated: 01/06/22 0620     WBC 9.19 10*3/mm3      RBC 4.29 10*6/mm3       Hemoglobin 11.6 g/dL      Hematocrit 36.3 %      MCV 84.6 fL      MCH 27.0 pg      MCHC 32.0 g/dL      RDW 13.3 %      RDW-SD 41.3 fl      MPV 10.5 fL      Platelets 234 10*3/mm3      Neutrophil % 65.0 %      Lymphocyte % 21.9 %      Monocyte % 10.3 %      Eosinophil % 1.2 %      Basophil % 0.4 %      Immature Grans % 1.2 %      Neutrophils, Absolute 5.97 10*3/mm3      Lymphocytes, Absolute 2.01 10*3/mm3      Monocytes, Absolute 0.95 10*3/mm3      Eosinophils, Absolute 0.11 10*3/mm3      Basophils, Absolute 0.04 10*3/mm3      Immature Grans, Absolute 0.11 10*3/mm3      nRBC 0.0 /100 WBC         Radiology Review:   Imaging Results (Last 72 Hours)     ** No results found for the last 72 hours. **            Assessment/Plan       Pregnancy      Assessment & Plan    Assessment:  1.  Intrauterine pregnancy at 39w1d weeks gestation with reassuring fetal status.    2.  H/o C/S x 1, desires repeat  3.  Obstetrical history significant for is non-contributory.  4.  GBS status: No results found for: GBSANTIGEN    Plan:  1. Proceed with RLTCD  2. Plan of care has been reviewed with patient   3.  Risks, benefits of treatment plan have been discussed.  4.  All questions have been answered.        Cathleen Houston, DO  1/6/2022  07:26 EST

## 2022-01-06 NOTE — ANESTHESIA PROCEDURE NOTES
Spinal Block      Patient reassessed immediately prior to procedure    Patient location during procedure: OR  Start Time: 1/6/2022 8:17 AM  Stop Time: 1/6/2022 8:20 AM  Indication:at surgeon's request, post-op pain management and procedure for pain  Performed By  CRNA: Mao Delacruz CRNA  Preanesthetic Checklist  Completed: patient identified, IV checked, site marked, risks and benefits discussed, surgical consent, monitors and equipment checked, pre-op evaluation and timeout performed  Spinal Block Prep:  Patient Position:sitting  Sterile Tech:cap, gloves, mask and sterile barriers  Prep:Betadine  Patient Monitoring:blood pressure monitoring, continuous pulse oximetry and EKG  Spinal Block Procedure  Approach:midline  Guidance:landmark technique  Location:L3-L4  Needle Type:Pencan  Needle Gauge:24 G  Placement of Spinal needle event:cerebrospinal fluid aspirated  Paresthesia: no  Fluid Appearance:clear     Post Assessment  Patient Tolerance:patient tolerated the procedure well with no apparent complications  Complications no

## 2022-01-07 LAB
BASOPHILS # BLD AUTO: 0.05 10*3/MM3 (ref 0–0.2)
BASOPHILS NFR BLD AUTO: 0.4 % (ref 0–1.5)
DEPRECATED RDW RBC AUTO: 41.5 FL (ref 37–54)
EOSINOPHIL # BLD AUTO: 0.15 10*3/MM3 (ref 0–0.4)
EOSINOPHIL NFR BLD AUTO: 1.2 % (ref 0.3–6.2)
ERYTHROCYTE [DISTWIDTH] IN BLOOD BY AUTOMATED COUNT: 13.4 % (ref 12.3–15.4)
HCT VFR BLD AUTO: 29.9 % (ref 34–46.6)
HGB BLD-MCNC: 9.5 G/DL (ref 12–15.9)
IMM GRANULOCYTES # BLD AUTO: 0.09 10*3/MM3 (ref 0–0.05)
IMM GRANULOCYTES NFR BLD AUTO: 0.7 % (ref 0–0.5)
LYMPHOCYTES # BLD AUTO: 1.61 10*3/MM3 (ref 0.7–3.1)
LYMPHOCYTES NFR BLD AUTO: 13.2 % (ref 19.6–45.3)
MCH RBC QN AUTO: 26.9 PG (ref 26.6–33)
MCHC RBC AUTO-ENTMCNC: 31.8 G/DL (ref 31.5–35.7)
MCV RBC AUTO: 84.7 FL (ref 79–97)
MONOCYTES # BLD AUTO: 1.08 10*3/MM3 (ref 0.1–0.9)
MONOCYTES NFR BLD AUTO: 8.8 % (ref 5–12)
NEUTROPHILS NFR BLD AUTO: 75.7 % (ref 42.7–76)
NEUTROPHILS NFR BLD AUTO: 9.23 10*3/MM3 (ref 1.7–7)
NRBC BLD AUTO-RTO: 0 /100 WBC (ref 0–0.2)
PLATELET # BLD AUTO: 203 10*3/MM3 (ref 140–450)
PMV BLD AUTO: 10.3 FL (ref 6–12)
RBC # BLD AUTO: 3.53 10*6/MM3 (ref 3.77–5.28)
WBC NRBC COR # BLD: 12.21 10*3/MM3 (ref 3.4–10.8)

## 2022-01-07 PROCEDURE — 85025 COMPLETE CBC W/AUTO DIFF WBC: CPT | Performed by: OBSTETRICS & GYNECOLOGY

## 2022-01-07 PROCEDURE — 63710000001 ONDANSETRON PER 8 MG: Performed by: OBSTETRICS & GYNECOLOGY

## 2022-01-07 RX ADMIN — OXYCODONE HYDROCHLORIDE AND ACETAMINOPHEN 1 TABLET: 10; 325 TABLET ORAL at 14:52

## 2022-01-07 RX ADMIN — ONDANSETRON HYDROCHLORIDE 4 MG: 4 TABLET, FILM COATED ORAL at 14:31

## 2022-01-07 RX ADMIN — OXYCODONE HYDROCHLORIDE AND ACETAMINOPHEN 1 TABLET: 10; 325 TABLET ORAL at 23:31

## 2022-01-07 RX ADMIN — IBUPROFEN 800 MG: 800 TABLET, FILM COATED ORAL at 21:28

## 2022-01-07 RX ADMIN — IBUPROFEN 800 MG: 800 TABLET, FILM COATED ORAL at 05:49

## 2022-01-07 RX ADMIN — SODIUM CHLORIDE, PRESERVATIVE FREE 10 ML: 5 INJECTION INTRAVENOUS at 09:40

## 2022-01-07 RX ADMIN — METOPROLOL TARTRATE 12.5 MG: 25 TABLET, FILM COATED ORAL at 21:28

## 2022-01-07 RX ADMIN — DOCUSATE SODIUM 100 MG: 100 CAPSULE ORAL at 14:30

## 2022-01-07 RX ADMIN — IBUPROFEN 800 MG: 800 TABLET, FILM COATED ORAL at 14:02

## 2022-01-07 RX ADMIN — METOPROLOL TARTRATE 12.5 MG: 25 TABLET, FILM COATED ORAL at 09:40

## 2022-01-07 RX ADMIN — OXYCODONE HYDROCHLORIDE AND ACETAMINOPHEN 1 TABLET: 10; 325 TABLET ORAL at 18:59

## 2022-01-07 RX ADMIN — SIMETHICONE 80 MG: 80 TABLET, CHEWABLE ORAL at 14:30

## 2022-01-07 RX ADMIN — PRENATAL VIT W/ FE FUMARATE-FA TAB 27-0.8 MG 1 TABLET: 27-0.8 TAB at 09:40

## 2022-01-07 NOTE — PROGRESS NOTES
Repeat Low Transverse  Section Progress Note      Hospital Course: G 2 now P 2. Patient was admitted on 2022  5:53 AM with IUP at 39w1d weeks gestation secondary to Pregnancy [Z34.90]. Patient underwent a repeat low transverse  section.       Patient stable. No complaints.     signs in last 24 hours:    Vital Signs Range for the last 24 hours              Temp:  [97.8 °F (36.6 °C)-98.9 °F (37.2 °C)] 98.2 °F (36.8 °C)  Heart Rate:  [] 93  Resp:  [16-18] 18  BP: (113-124)/(61-76) 113/62     Radiology     Imaging Results (Last 24 Hours)     ** No results found for the last 24 hours. **           Labs     Lab Results (last 24 hours)     Procedure Component Value Units Date/Time    CBC & Differential [[id number]]  (Abnormal) Collected: 22    Specimen: Blood Updated: 22    Narrative:      The following orders were created for panel order CBC & Differential.  Procedure                               Abnormality         Status                     ---------                               -----------         ------                     CBC Auto Differential[[id number]]        Abnormal            Final result                 Please view results for these tests on the individual orders.    CBC Auto Differential [[id number]]  (Abnormal) Collected: 22    Specimen: Blood Updated: 22     WBC 12.21 10*3/mm3      RBC 3.53 10*6/mm3      Hemoglobin 9.5 g/dL      Hematocrit 29.9 %      MCV 84.7 fL      MCH 26.9 pg      MCHC 31.8 g/dL      RDW 13.4 %      RDW-SD 41.5 fl      MPV 10.3 fL      Platelets 203 10*3/mm3      Neutrophil % 75.7 %      Lymphocyte % 13.2 %      Monocyte % 8.8 %      Eosinophil % 1.2 %      Basophil % 0.4 %      Immature Grans % 0.7 %      Neutrophils, Absolute 9.23 10*3/mm3      Lymphocytes, Absolute 1.61 10*3/mm3      Monocytes, Absolute 1.08 10*3/mm3      Eosinophils, Absolute 0.15 10*3/mm3      Basophils, Absolute 0.05 10*3/mm3      Immature Grans,  Absolute 0.09 10*3/mm3      nRBC 0.0 /100 WBC             Review of Systems    The following systems were reviewed and negative;  ENT, respiratory, cardiovascular, gastrointestinal, genitourinary, breast, endocrine and allergies / immunologic.      Physical Exam:      General Appearance:    Alert, cooperative, in no acute distress   Head:    Normocephalic, without obvious abnormality, atraumatic   Eyes:            Lids and lashes normal, conjunctivae and sclerae normal, no   icterus, no pallor, corneas clear, PERRLA   Ears:    Ears appear intact with no abnormalities noted   Throat:   No oral lesions, no thrush, oral mucosa moist   Neck:   No adenopathy, supple, trachea midline, no thyromegaly, no     carotid bruit, no JVD   Back:     No kyphosis present, no scoliosis present, no skin lesions,       erythema or scars, no tenderness to percussion or                   palpation,   range of motion normal   Lungs:     Clear to auscultation,respirations regular, even and                   unlabored    Heart:    Regular rhythm and normal rate, normal S1 and S2, no            murmur, no gallop, no rub, no click   Breast Exam:    Deferred   Abdomen:     Normal bowel sounds, no masses, no organomegaly, soft        non-tender, non-distended, no guarding, no rebound                 tenderness   Genitalia:    Deferred   Extremities:   Moves all extremities well, no edema, no cyanosis, no              redness   Pulses:   Pulses palpable and equal bilaterally   Skin:   No bleeding, bruising or rash   Lymph nodes:   No palpable adenopathy   Neurologic:   Cranial nerves 2 - 12 grossly intact, sensation intact, DTR        present and equal bilaterally                 Assessment:  1.  Repeat Low Transverse  Section. POD#1. Patient doing well. No complaints.     Plan:  1. Will continue current plan of care and anticipate discharge to home in the AM.      Abad Keller III, MD  22  10:17 EST

## 2022-01-07 NOTE — PLAN OF CARE
Goal Outcome Evaluation:            Resting in bed at this time. Has showered and ambulated in room. Voiding without diff. Denies needs

## 2022-01-07 NOTE — PLAN OF CARE
Goal Outcome Evaluation:           Progress: improving  Outcome Summary: Pt's vitals and bleeding wnl, pain controlled with prn meds, fundus firm, no complaints at this time

## 2022-01-08 ENCOUNTER — APPOINTMENT (OUTPATIENT)
Dept: ULTRASOUND IMAGING | Facility: HOSPITAL | Age: 32
End: 2022-01-08

## 2022-01-08 ENCOUNTER — APPOINTMENT (OUTPATIENT)
Dept: CT IMAGING | Facility: HOSPITAL | Age: 32
End: 2022-01-08

## 2022-01-08 ENCOUNTER — APPOINTMENT (OUTPATIENT)
Dept: GENERAL RADIOLOGY | Facility: HOSPITAL | Age: 32
End: 2022-01-08

## 2022-01-08 ENCOUNTER — APPOINTMENT (OUTPATIENT)
Dept: CARDIOLOGY | Facility: HOSPITAL | Age: 32
End: 2022-01-08

## 2022-01-08 LAB
ALBUMIN SERPL-MCNC: 2.91 G/DL (ref 3.5–5.2)
ALBUMIN/GLOB SERPL: 1.2 G/DL
ALP SERPL-CCNC: 109 U/L (ref 39–117)
ALT SERPL W P-5'-P-CCNC: 15 U/L (ref 1–33)
ANION GAP SERPL CALCULATED.3IONS-SCNC: 11.8 MMOL/L (ref 5–15)
AST SERPL-CCNC: 31 U/L (ref 1–32)
BACTERIA UR QL AUTO: ABNORMAL /HPF
BASOPHILS # BLD AUTO: 0.04 10*3/MM3 (ref 0–0.2)
BASOPHILS NFR BLD AUTO: 0.6 % (ref 0–1.5)
BH CV ECHO MEAS - ACS: 1.8 CM
BH CV ECHO MEAS - AO MAX PG: 9.6 MMHG
BH CV ECHO MEAS - AO MEAN PG: 6 MMHG
BH CV ECHO MEAS - AO ROOT AREA (BSA CORRECTED): 1.4
BH CV ECHO MEAS - AO ROOT AREA: 7.5 CM^2
BH CV ECHO MEAS - AO ROOT DIAM: 3.1 CM
BH CV ECHO MEAS - AO V2 MAX: 155 CM/SEC
BH CV ECHO MEAS - AO V2 MEAN: 119 CM/SEC
BH CV ECHO MEAS - AO V2 VTI: 32.6 CM
BH CV ECHO MEAS - BSA(HAYCOCK): 2.3 M^2
BH CV ECHO MEAS - BSA: 2.1 M^2
BH CV ECHO MEAS - BZI_BMI: 36.2 KILOGRAMS/M^2
BH CV ECHO MEAS - BZI_METRIC_HEIGHT: 170.2 CM
BH CV ECHO MEAS - BZI_METRIC_WEIGHT: 104.8 KG
BH CV ECHO MEAS - EDV(CUBED): 79.5 ML
BH CV ECHO MEAS - EDV(MOD-SP4): 79.8 ML
BH CV ECHO MEAS - EDV(TEICH): 83.1 ML
BH CV ECHO MEAS - EF(CUBED): 66 %
BH CV ECHO MEAS - EF(MOD-SP4): 54.1 %
BH CV ECHO MEAS - EF(TEICH): 57.9 %
BH CV ECHO MEAS - ESV(CUBED): 27 ML
BH CV ECHO MEAS - ESV(MOD-SP4): 36.6 ML
BH CV ECHO MEAS - ESV(TEICH): 35 ML
BH CV ECHO MEAS - FS: 30.2 %
BH CV ECHO MEAS - IVS/LVPW: 0.85
BH CV ECHO MEAS - IVSD: 1.1 CM
BH CV ECHO MEAS - LA DIMENSION: 2.4 CM
BH CV ECHO MEAS - LA/AO: 0.77
BH CV ECHO MEAS - LV DIASTOLIC VOL/BSA (35-75): 37.1 ML/M^2
BH CV ECHO MEAS - LV MASS(C)D: 184.7 GRAMS
BH CV ECHO MEAS - LV MASS(C)DI: 85.9 GRAMS/M^2
BH CV ECHO MEAS - LV SYSTOLIC VOL/BSA (12-30): 17 ML/M^2
BH CV ECHO MEAS - LVIDD: 4.3 CM
BH CV ECHO MEAS - LVIDS: 3 CM
BH CV ECHO MEAS - LVLD AP4: 6.9 CM
BH CV ECHO MEAS - LVLS AP4: 5.6 CM
BH CV ECHO MEAS - LVOT AREA (M): 3.5 CM^2
BH CV ECHO MEAS - LVOT AREA: 3.5 CM^2
BH CV ECHO MEAS - LVOT DIAM: 2.1 CM
BH CV ECHO MEAS - LVPWD: 1.3 CM
BH CV ECHO MEAS - MV A MAX VEL: 103 CM/SEC
BH CV ECHO MEAS - MV E MAX VEL: 85.9 CM/SEC
BH CV ECHO MEAS - MV E/A: 0.83
BH CV ECHO MEAS - PA ACC TIME: 0.1 SEC
BH CV ECHO MEAS - PA PR(ACCEL): 36.3 MMHG
BH CV ECHO MEAS - RAP SYSTOLE: 10 MMHG
BH CV ECHO MEAS - RVSP: 23.1 MMHG
BH CV ECHO MEAS - SI(AO): 114.5 ML/M^2
BH CV ECHO MEAS - SI(CUBED): 24.4 ML/M^2
BH CV ECHO MEAS - SI(MOD-SP4): 20.1 ML/M^2
BH CV ECHO MEAS - SI(TEICH): 22.4 ML/M^2
BH CV ECHO MEAS - SV(AO): 246.1 ML
BH CV ECHO MEAS - SV(CUBED): 52.5 ML
BH CV ECHO MEAS - SV(MOD-SP4): 43.2 ML
BH CV ECHO MEAS - SV(TEICH): 48.1 ML
BH CV ECHO MEAS - TR MAX VEL: 181 CM/SEC
BILIRUB SERPL-MCNC: <0.2 MG/DL (ref 0–1.2)
BILIRUB UR QL STRIP: NEGATIVE
BUN SERPL-MCNC: 3 MG/DL (ref 6–20)
BUN/CREAT SERPL: 5.9 (ref 7–25)
CALCIUM SPEC-SCNC: 8.3 MG/DL (ref 8.6–10.5)
CHLORIDE SERPL-SCNC: 105 MMOL/L (ref 98–107)
CLARITY UR: CLEAR
CO2 SERPL-SCNC: 20.2 MMOL/L (ref 22–29)
COLOR UR: YELLOW
CREAT SERPL-MCNC: 0.51 MG/DL (ref 0.57–1)
CRP SERPL-MCNC: 9.49 MG/DL (ref 0–0.5)
D-LACTATE SERPL-SCNC: 2 MMOL/L (ref 0.5–2)
DEPRECATED RDW RBC AUTO: 41.5 FL (ref 37–54)
EOSINOPHIL # BLD AUTO: 0.06 10*3/MM3 (ref 0–0.4)
EOSINOPHIL NFR BLD AUTO: 0.9 % (ref 0.3–6.2)
ERYTHROCYTE [DISTWIDTH] IN BLOOD BY AUTOMATED COUNT: 13.3 % (ref 12.3–15.4)
GFR SERPL CREATININE-BSD FRML MDRD: 142 ML/MIN/1.73
GLOBULIN UR ELPH-MCNC: 2.5 GM/DL
GLUCOSE BLDC GLUCOMTR-MCNC: 124 MG/DL (ref 70–130)
GLUCOSE SERPL-MCNC: 138 MG/DL (ref 65–99)
GLUCOSE UR STRIP-MCNC: NEGATIVE MG/DL
HCT VFR BLD AUTO: 30.8 % (ref 34–46.6)
HGB BLD-MCNC: 9.8 G/DL (ref 12–15.9)
HGB UR QL STRIP.AUTO: ABNORMAL
HYALINE CASTS UR QL AUTO: ABNORMAL /LPF
IMM GRANULOCYTES # BLD AUTO: 0.1 10*3/MM3 (ref 0–0.05)
IMM GRANULOCYTES NFR BLD AUTO: 1.4 % (ref 0–0.5)
KETONES UR QL STRIP: NEGATIVE
LEUKOCYTE ESTERASE UR QL STRIP.AUTO: NEGATIVE
LV EF 2D ECHO EST: 75 %
LYMPHOCYTES # BLD AUTO: 0.44 10*3/MM3 (ref 0.7–3.1)
LYMPHOCYTES NFR BLD AUTO: 6.3 % (ref 19.6–45.3)
MAXIMAL PREDICTED HEART RATE: 190 BPM
MCH RBC QN AUTO: 27.4 PG (ref 26.6–33)
MCHC RBC AUTO-ENTMCNC: 31.8 G/DL (ref 31.5–35.7)
MCV RBC AUTO: 86 FL (ref 79–97)
MONOCYTES # BLD AUTO: 0.86 10*3/MM3 (ref 0.1–0.9)
MONOCYTES NFR BLD AUTO: 12.3 % (ref 5–12)
NEUTROPHILS NFR BLD AUTO: 5.5 10*3/MM3 (ref 1.7–7)
NEUTROPHILS NFR BLD AUTO: 78.5 % (ref 42.7–76)
NITRITE UR QL STRIP: NEGATIVE
NRBC BLD AUTO-RTO: 0 /100 WBC (ref 0–0.2)
PH UR STRIP.AUTO: 7.5 [PH] (ref 5–8)
PLATELET # BLD AUTO: 191 10*3/MM3 (ref 140–450)
PMV BLD AUTO: 10.4 FL (ref 6–12)
POTASSIUM SERPL-SCNC: 3.7 MMOL/L (ref 3.5–5.2)
PROT SERPL-MCNC: 5.4 G/DL (ref 6–8.5)
PROT UR QL STRIP: NEGATIVE
QT INTERVAL: 260 MS
QTC INTERVAL: 417 MS
RBC # BLD AUTO: 3.58 10*6/MM3 (ref 3.77–5.28)
RBC # UR STRIP: ABNORMAL /HPF
REF LAB TEST METHOD: ABNORMAL
SODIUM SERPL-SCNC: 137 MMOL/L (ref 136–145)
SP GR UR STRIP: 1.01 (ref 1–1.03)
SQUAMOUS #/AREA URNS HPF: ABNORMAL /HPF
STRESS TARGET HR: 162 BPM
TROPONIN T SERPL-MCNC: <0.01 NG/ML (ref 0–0.03)
UROBILINOGEN UR QL STRIP: ABNORMAL
WBC # UR STRIP: ABNORMAL /HPF
WBC NRBC COR # BLD: 7 10*3/MM3 (ref 3.4–10.8)

## 2022-01-08 PROCEDURE — 71275 CT ANGIOGRAPHY CHEST: CPT

## 2022-01-08 PROCEDURE — 84484 ASSAY OF TROPONIN QUANT: CPT | Performed by: OBSTETRICS & GYNECOLOGY

## 2022-01-08 PROCEDURE — 81001 URINALYSIS AUTO W/SCOPE: CPT | Performed by: HOSPITALIST

## 2022-01-08 PROCEDURE — 82962 GLUCOSE BLOOD TEST: CPT

## 2022-01-08 PROCEDURE — 25010000002 AZITHROMYCIN PER 500 MG: Performed by: HOSPITALIST

## 2022-01-08 PROCEDURE — 86140 C-REACTIVE PROTEIN: CPT | Performed by: HOSPITALIST

## 2022-01-08 PROCEDURE — 83605 ASSAY OF LACTIC ACID: CPT | Performed by: HOSPITALIST

## 2022-01-08 PROCEDURE — 93010 ELECTROCARDIOGRAM REPORT: CPT | Performed by: INTERNAL MEDICINE

## 2022-01-08 PROCEDURE — 93306 TTE W/DOPPLER COMPLETE: CPT

## 2022-01-08 PROCEDURE — 99255 IP/OBS CONSLTJ NEW/EST HI 80: CPT | Performed by: HOSPITALIST

## 2022-01-08 PROCEDURE — 93005 ELECTROCARDIOGRAM TRACING: CPT | Performed by: OBSTETRICS & GYNECOLOGY

## 2022-01-08 PROCEDURE — 76856 US EXAM PELVIC COMPLETE: CPT

## 2022-01-08 PROCEDURE — 85025 COMPLETE CBC W/AUTO DIFF WBC: CPT | Performed by: HOSPITALIST

## 2022-01-08 PROCEDURE — 71045 X-RAY EXAM CHEST 1 VIEW: CPT

## 2022-01-08 PROCEDURE — 93306 TTE W/DOPPLER COMPLETE: CPT | Performed by: INTERNAL MEDICINE

## 2022-01-08 PROCEDURE — 84484 ASSAY OF TROPONIN QUANT: CPT | Performed by: HOSPITALIST

## 2022-01-08 PROCEDURE — 25010000002 CEFTRIAXONE PER 250 MG: Performed by: HOSPITALIST

## 2022-01-08 PROCEDURE — 0 IOPAMIDOL PER 1 ML: Performed by: OBSTETRICS & GYNECOLOGY

## 2022-01-08 PROCEDURE — 87040 BLOOD CULTURE FOR BACTERIA: CPT | Performed by: HOSPITALIST

## 2022-01-08 PROCEDURE — 80053 COMPREHEN METABOLIC PANEL: CPT | Performed by: HOSPITALIST

## 2022-01-08 RX ORDER — POLYETHYLENE GLYCOL 3350 17 G/17G
17 POWDER, FOR SOLUTION ORAL DAILY
Status: DISCONTINUED | OUTPATIENT
Start: 2022-01-08 | End: 2022-01-09 | Stop reason: HOSPADM

## 2022-01-08 RX ORDER — METOPROLOL TARTRATE 5 MG/5ML
5 INJECTION INTRAVENOUS ONCE
Status: COMPLETED | OUTPATIENT
Start: 2022-01-08 | End: 2022-01-08

## 2022-01-08 RX ORDER — METOPROLOL TARTRATE 5 MG/5ML
INJECTION INTRAVENOUS
Status: COMPLETED
Start: 2022-01-08 | End: 2022-01-08

## 2022-01-08 RX ADMIN — SODIUM CHLORIDE, PRESERVATIVE FREE 10 ML: 5 INJECTION INTRAVENOUS at 08:38

## 2022-01-08 RX ADMIN — SIMETHICONE 80 MG: 80 TABLET, CHEWABLE ORAL at 23:02

## 2022-01-08 RX ADMIN — PRENATAL VIT W/ FE FUMARATE-FA TAB 27-0.8 MG 1 TABLET: 27-0.8 TAB at 08:50

## 2022-01-08 RX ADMIN — POLYETHYLENE GLYCOL 3350 17 G: 17 POWDER, FOR SOLUTION ORAL at 22:13

## 2022-01-08 RX ADMIN — OXYCODONE HYDROCHLORIDE AND ACETAMINOPHEN 1 TABLET: 10; 325 TABLET ORAL at 11:20

## 2022-01-08 RX ADMIN — CEFTRIAXONE 1 G: 1 INJECTION, POWDER, FOR SOLUTION INTRAMUSCULAR; INTRAVENOUS at 08:44

## 2022-01-08 RX ADMIN — OXYCODONE HYDROCHLORIDE AND ACETAMINOPHEN 1 TABLET: 10; 325 TABLET ORAL at 05:44

## 2022-01-08 RX ADMIN — METOPROLOL TARTRATE 5 MG: 5 INJECTION INTRAVENOUS at 05:28

## 2022-01-08 RX ADMIN — METOPROLOL TARTRATE 25 MG: 25 TABLET ORAL at 05:44

## 2022-01-08 RX ADMIN — OXYCODONE HYDROCHLORIDE AND ACETAMINOPHEN 1 TABLET: 10; 325 TABLET ORAL at 19:41

## 2022-01-08 RX ADMIN — IBUPROFEN 800 MG: 800 TABLET, FILM COATED ORAL at 13:40

## 2022-01-08 RX ADMIN — SODIUM CHLORIDE, POTASSIUM CHLORIDE, SODIUM LACTATE AND CALCIUM CHLORIDE 125 ML/HR: 600; 310; 30; 20 INJECTION, SOLUTION INTRAVENOUS at 10:07

## 2022-01-08 RX ADMIN — SIMETHICONE 80 MG: 80 TABLET, CHEWABLE ORAL at 17:04

## 2022-01-08 RX ADMIN — METOPROLOL TARTRATE 25 MG: 25 TABLET ORAL at 20:58

## 2022-01-08 RX ADMIN — IOPAMIDOL 70 ML: 755 INJECTION, SOLUTION INTRAVENOUS at 07:08

## 2022-01-08 RX ADMIN — IBUPROFEN 800 MG: 800 TABLET, FILM COATED ORAL at 21:00

## 2022-01-08 RX ADMIN — AZITHROMYCIN MONOHYDRATE 500 MG: 500 INJECTION, POWDER, LYOPHILIZED, FOR SOLUTION INTRAVENOUS at 11:04

## 2022-01-08 NOTE — CONSULTS
Hospitalist Consult Note        Patient Identification  Name: Yael Robbins  Age/Sex: 32 y.o. female  :  1990        MRN: [id number]  Visit Number: [id number]  PCP: Jada Perez DO    Consults    Referring Provider: Dr Keller, OB/GYN  Reason for Consultation: tachycardia      History of present illness:  Patient is a 32 year old female POD #2 s/p  section 2021. She has a history of peripartum tachycardia since the first trimester of this recent pregnancy. She was referred to cardiology and started on metoprolol which helped keep her heart rate under control. She states she was recently increased from 12.5mg BID to 25mg BID by her cardiologist, but has only been receiving 12.5mg since she was hospitalized for the  section. This morning, rapid response was called when the patient developed chest tightness and dyspnea while breastfeeding her  and was found to be in tachycardia with HR in the 160s. Her post-partum period has been reportedly uneventful. She has had lower abdominal cramping since surgery but no worse this evening than previous days. Upon further questioning, she has had some increased pain/pressure with urination post-op as well, with some blood in her urine, but denies any cloudy or foul smelling urine. She does feel congested in her chest but denies cough. She is currently febrile during my exam, with temperature 101.8 and later 100.9 F. Initial EKG was of poor quality due to artifact, but repeat showed sinus tachycardia, , QTc 417, with no evidence of acute ischemia appreciated per my review, but I did appreciate Q waves and inverted T waves in lead III. HR improved with IV metoprolol 5mg and initiation of 1L IV fluid bolus, down to 120s, and patient's symptoms of chest tightness and dyspnea persisted but were less severe. Hospitalist service has been consulted for further assistance in workup and management of tachycardia.     Review of  Systems  Review of Systems   Constitutional: Negative for activity change, appetite change, chills, diaphoresis, fatigue, fever and unexpected weight change.   HENT: Negative for congestion, postnasal drip, rhinorrhea, sinus pressure, sinus pain and sore throat.    Eyes: Negative for photophobia, pain, discharge, redness, itching and visual disturbance.   Respiratory: Positive for chest tightness and shortness of breath. Negative for cough and wheezing.    Cardiovascular: Negative for chest pain, palpitations and leg swelling.   Gastrointestinal: Positive for abdominal pain (lower quadrant/suprapubic). Negative for abdominal distention, constipation, diarrhea, nausea and vomiting.   Endocrine: Negative for cold intolerance, heat intolerance, polydipsia, polyphagia and polyuria.   Genitourinary: Positive for dysuria, pelvic pain and vaginal bleeding. Negative for decreased urine volume, difficulty urinating, flank pain, frequency and hematuria.   Musculoskeletal: Negative for arthralgias, back pain, joint swelling, myalgias, neck pain and neck stiffness.   Skin: Negative for color change, pallor, rash and wound.   Neurological: Negative for dizziness, tremors, seizures, syncope, weakness, light-headedness, numbness and headaches.   Hematological: Negative for adenopathy. Does not bruise/bleed easily.   Psychiatric/Behavioral: Negative for agitation, behavioral problems and confusion.       History  Past Medical History:   Diagnosis Date   • Tachycardia       Past Surgical History:   Procedure Laterality Date   •  SECTION     •  SECTION N/A 2022    Procedure:  SECTION REPEAT;  Surgeon: Cathleen Houston DO;  Location:  [facility];  Service: Obstetrics/Gynecology;  Laterality: N/A;   ,  Family History   Problem Relation Age of Onset   • No Known Problems Mother    • Hypertension Father    • Leukemia Sister       Social History     Tobacco Use   • Smoking status: Never  Smoker   • Smokeless tobacco: Never Used   Vaping Use   • Vaping Use: Never used   Substance Use Topics   • Alcohol use: Never   • Drug use: Never      ALLERGIES: Patient has no known allergies.    Objective     Vital Signs   Temp:  [98.1 °F (36.7 °C)-101.8 °F (38.8 °C)] 100.9 °F (38.3 °C)  Heart Rate:  [] 130  Resp:  [18-20] 18  BP: (116-124)/(64-75) 124/64      22  0616   Weight: 105 kg (231 lb)     Body mass index is 36.18 kg/m².    Physical Exam:  Physical Exam  Constitutional:       Comments: Young female, initially in distress upon my arrival to room, but later more calm as HR came down.    HENT:      Head: Normocephalic and atraumatic.      Right Ear: External ear normal.      Left Ear: External ear normal.      Nose: Nose normal.      Mouth/Throat:      Mouth: Mucous membranes are moist.      Pharynx: Oropharynx is clear.   Eyes:      Extraocular Movements: Extraocular movements intact.      Conjunctiva/sclera: Conjunctivae normal.      Pupils: Pupils are equal, round, and reactive to light.   Cardiovascular:      Rate and Rhythm: Regular rhythm. Tachycardia present.      Pulses: Normal pulses.      Heart sounds: Normal heart sounds.   Pulmonary:      Effort: Pulmonary effort is normal. No respiratory distress.      Breath sounds: Normal breath sounds. No wheezing or rales.   Abdominal:      General: Abdomen is flat. Bowel sounds are normal.      Palpations: Abdomen is soft.      Tenderness: There is abdominal tenderness (supraubic region over area of  section).   Musculoskeletal:         General: Normal range of motion.      Cervical back: Normal range of motion and neck supple.      Right lower leg: No edema.      Left lower leg: No edema.   Skin:     General: Skin is warm and dry.      Capillary Refill: Capillary refill takes less than 2 seconds.      Coloration: Skin is not jaundiced.      Findings: No bruising.      Comments:  surgical incision appears dry/clean/intact at  this time   Neurological:      General: No focal deficit present.      Mental Status: She is oriented to person, place, and time.   Psychiatric:         Mood and Affect: Mood normal.         Behavior: Behavior normal.         Thought Content: Thought content normal.         Judgment: Judgment normal.         Results Review:    Results from last 7 days   Lab Units 01/07/22  0649 01/06/22  0610   WBC 10*3/mm3 12.21* 9.19   HEMOGLOBIN g/dL 9.5* 11.6*   PLATELETS 10*3/mm3 203 234       No results found for: HGBA1C     I have reviewed the patient's laboratory results.           Imaging Results (Last 72 Hours)     ** No results found for the last 72 hours. **            EKG:  sinus tachycardia, , QTc 417, with no evidence of acute ischemia appreciated per my review, but I did appreciate Q waves and inverted T waves in lead III.    I have personally reviewed and interpreted the above EKG.         Assessment/Plan     - Post-partum sinus tachycardia, chest tightness, shortness of breath, in setting of peripartum tachycardia for which she was started on metoprolol, with dose increased to 25mg BID by cardiology prior to delivery, yet she has only been receiving 12.5mg BID since admission. This could be contributing to her severe tachycardia, and HR did respond partially to IV metoprolol. Will increase PO metoprolol back to 25mg BID starting this morning. Will obtain ECHO to look for evidence of mustapha-/post-partum cardiomyopathy. I did not appreciate any evidence of acute ischemia on EKG but will trend troponin x3. Also concern for PE given hypercoagulable state mustapha-partum and EKG did show Q waves and T wave inversion in lead III as can sometimes be seen with PE (though no S wave in lead I); will rule out with CT PE protocol. Fever raises concerns for sepsis as well. Will collect UA and look for evidence of pneumonia with above-mentioned chest CT. Spoke with on-call Dr Keller who recommended transabdominal pelvic US to  look for evidence of post- pelvic infection. He also recommended empiric dose of IV rocephin and azithromycin to cover for such infections, which would also cover for possible UTI and/or pneumonia if present. Blood cultures x2 ordered, along with CRP, lactate, and CBC. Further recommendations to follow depending on results of above workup.     Thank you for the consultation, Dr Keller. Hospitalist service will continue to follow the patient with you.     I discussed the patients findings and my recommendations with patient, her significant other in the room, and nursing staff in [facility].    Sanjiv Mistry MD  22  06:02 EST

## 2022-01-08 NOTE — PLAN OF CARE
Goal Outcome Evaluation:  Plan of Care Reviewed With: patient           Outcome Summary: Patient doing well, vital signs stable, hr decreased to 90s to 110s, patient states she is feeling better with less pressure in her chest and she is not smothering now, patient is ambulating in room to bathroom and is voiding well, will continue to monitor

## 2022-01-08 NOTE — PROGRESS NOTES
Repeat Low Transverse  Section Progress Note      Hospital Course: G 2, now P 2. Patient was admitted on 2022  5:53 AM with IUP at 39w1d weeks gestation secondary to Pregnancy [Z34.90]. Patient underwent a repeat low transverse  section.       signs in last 24 hours:    Vital Signs Range for the last 24 hours              Temp:  [98.5 °F (36.9 °C)-101.8 °F (38.8 °C)] 98.8 °F (37.1 °C)  Heart Rate:  [108-168] 120  Resp:  [18-22] 20  BP: (108-140)/(54-75) 140/69     Radiology     Imaging Results (Last 24 Hours)     Procedure Component Value Units Date/Time    CT Chest Pulmonary Embolism [[id number]] Collected: 22     Updated: 22    Narrative:      CT CHEST PULMONARY EMBOLISM W CONTRAST    INDICATION:   Prior  section, pulmonary embolism suspected, high probability shortness of air    TECHNIQUE:   CT angiogram of the chest with IV contrast. 3-D reconstructions were obtained and reviewed.   Radiation dose reduction techniques included automated exposure control or exposure modulation based on body size. Count of known CT and cardiac nuc med studies  performed in previous 12 months: 0.     COMPARISON:   None available.    FINDINGS:     Adequate opacification of the pulmonary arteries. No filling defect to suggest pulmonary embolus. The lungs demonstrate no acute process. No pleural effusion. No pneumothorax. Normal heart size. No pericardial effusion. Normal size thoracic aorta. No  acute upper abdominal abnormality allowing for contrast bolus timing and limited field-of-view. Fatty lesion at the hepatic dome. No destructive osseous lesion.      Impression:      1. Negative for pulmonary embolus.    2. No acute findings in the chest.    Signer Name: RICO WHITTEN MD   Signed: 2022 7:48 AM   Workstation Name: [email or web address]    [facility]    XR Chest 1 View [[id number]] Collected: 22     Updated: 22    Narrative:      CR Chest 1  Vw    INDICATION:   Chest tightness, previous  the liver     COMPARISON:    None available.    FINDINGS:  Portable AP view(s) of the chest.    The heart and mediastinal contours are normal. The lungs are clear. No pneumothorax or pleural effusion.       Impression:      No acute cardiopulmonary findings.    Signer Name: RICO WHITTEN MD   Signed: 2022 7:10 AM   Workstation Name: [facility]    Radiology [facility] Pelvis Complete [[id number]] Collected: 22     Updated: 22    Narrative:      US Pelvis Non-OB Comp    INDICATION:   Postpartum infection. Recent  section. Fever and tachycardia.    COMPARISON:   None available.    TECHNIQUE:  Transabdominal imaging of the pelvis in multiple planes.    FINDINGS:  Uterus is enlarged measuring 20.6 x 10.6 x 18.8 cm. The endometrial stripe is thickened at 2.0 cm. No endometrial fluid is seen. The ovaries are not identified. The adnexa are unremarkable. No fluid collections.      Impression:        1. Enlarged uterus compatible with recent childbirth. There is thickening of the endometrial stripe at 2.0 cm.  2. Nonvisualization of the ovaries.  3. The pelvis could be better characterized with a contrast-enhanced CT if indicated.    Signer Name: Gerson Moe MD   Signed: 2022 6:59 AM   Workstation Name: [facility]    Radiology [facility]           Labs     Lab Results (last 24 hours)     Procedure Component Value Units Date/Time    Troponin [[id number]]  (Normal) Collected: 22    Specimen: Blood Updated: 22     Troponin T <0.010 ng/mL     Narrative:      Troponin T Reference Range:  <= 0.03 ng/mL-   Negative for AMI  >0.03 ng/mL-     Abnormal for myocardial necrosis.  Clinicians would have to utilize clinical acumen, EKG, Troponin and serial changes to determine if it is an Acute Myocardial Infarction or myocardial injury due to an underlying chronic condition.       Results  may be falsely decreased if patient taking Biotin.      Blood Culture - Blood, Arm, Left [[id number]] Collected: 01/08/22 0817    Specimen: Blood from Arm, Left Updated: 01/08/22 0828    Blood Culture - Blood, Arm, Left [[id number]] Collected: 01/08/22 0817    Specimen: Blood from Arm, Left Updated: 01/08/22 0828    Urinalysis, Microscopic Only - Urine, Clean Catch [[id number]]  (Abnormal) Collected: 01/08/22 0649    Specimen: Urine, Clean Catch Updated: 01/08/22 0737     RBC, UA 31-50 /HPF      WBC, UA 0-2 /HPF      Bacteria, UA None Seen /HPF      Squamous Epithelial Cells, UA 0-2 /HPF      Hyaline Casts, UA None Seen /LPF      Methodology Automated Microscopy    Urinalysis With Culture If Indicated - Urine, Clean Catch [[id number]]  (Abnormal) Collected: 01/08/22 0649    Specimen: Urine, Clean Catch Updated: 01/08/22 0737     Color, UA Yellow     Appearance, UA Clear     pH, UA 7.5     Specific Gravity, UA 1.007     Glucose, UA Negative     Ketones, UA Negative     Bilirubin, UA Negative     Blood, UA Large (3+)     Protein, UA Negative     Leuk Esterase, UA Negative     Nitrite, UA Negative     Urobilinogen, UA 1.0 E.U./dL    Comprehensive Metabolic Panel [[id number]]  (Abnormal) Collected: 01/08/22 0601    Specimen: Blood Updated: 01/08/22 0617     Glucose 138 mg/dL      BUN 3 mg/dL      Creatinine 0.51 mg/dL      Sodium 137 mmol/L      Potassium 3.7 mmol/L      Chloride 105 mmol/L      CO2 20.2 mmol/L      Calcium 8.3 mg/dL      Total Protein 5.4 g/dL      Albumin 2.91 g/dL      ALT (SGPT) 15 U/L      AST (SGOT) 31 U/L      Alkaline Phosphatase 109 U/L      Total Bilirubin <0.2 mg/dL      eGFR Non African Amer 142 mL/min/1.73      Globulin 2.5 gm/dL      A/G Ratio 1.2 g/dL      BUN/Creatinine Ratio 5.9     Anion Gap 11.8 mmol/L     Narrative:      GFR Normal >60  Chronic Kidney Disease <60  Kidney Failure <15      C-reactive Protein [[id number]]  (Abnormal) Collected: 01/08/22 0601    Specimen: Blood Updated:  01/08/22 0617     C-Reactive Protein 9.49 mg/dL     Lactic Acid, Plasma [[id number]]  (Normal) Collected: 01/08/22 0601    Specimen: Blood Updated: 01/08/22 0611     Lactate 2.0 mmol/L     CBC & Differential [[id number]]  (Abnormal) Collected: 01/08/22 0601    Specimen: Blood Updated: 01/08/22 0604    Narrative:      The following orders were created for panel order CBC & Differential.  Procedure                               Abnormality         Status                     ---------                               -----------         ------                     CBC Auto Differential[[id number]]        Abnormal            Final result                 Please view results for these tests on the individual orders.    CBC Auto Differential [[id number]]  (Abnormal) Collected: 01/08/22 0601    Specimen: Blood Updated: 01/08/22 0604     WBC 7.00 10*3/mm3      RBC 3.58 10*6/mm3      Hemoglobin 9.8 g/dL      Hematocrit 30.8 %      MCV 86.0 fL      MCH 27.4 pg      MCHC 31.8 g/dL      RDW 13.3 %      RDW-SD 41.5 fl      MPV 10.4 fL      Platelets 191 10*3/mm3      Neutrophil % 78.5 %      Lymphocyte % 6.3 %      Monocyte % 12.3 %      Eosinophil % 0.9 %      Basophil % 0.6 %      Immature Grans % 1.4 %      Neutrophils, Absolute 5.50 10*3/mm3      Lymphocytes, Absolute 0.44 10*3/mm3      Monocytes, Absolute 0.86 10*3/mm3      Eosinophils, Absolute 0.06 10*3/mm3      Basophils, Absolute 0.04 10*3/mm3      Immature Grans, Absolute 0.10 10*3/mm3      nRBC 0.0 /100 WBC     Troponin [[id number]]  (Normal) Collected: 01/08/22 0525    Specimen: Blood Updated: 01/08/22 0603     Troponin T <0.010 ng/mL     Narrative:      Troponin T Reference Range:  <= 0.03 ng/mL-   Negative for AMI  >0.03 ng/mL-     Abnormal for myocardial necrosis.  Clinicians would have to utilize clinical acumen, EKG, Troponin and serial changes to determine if it is an Acute Myocardial Infarction or myocardial injury due to an underlying chronic condition.       Results  may be falsely decreased if patient taking Biotin.      POC Glucose Once [[id number]]  (Normal) Collected: 22    Specimen: Blood Updated: 22     Glucose 124 mg/dL      Comment: Meter: [id number] : [id number] Conner Mary Jo A               Review of Systems    The following systems were reviewed and negative;  ENT,  gastrointestinal, genitourinary, breast, endocrine and allergies / immunologic.      Physical Exam:      General Appearance:    Alert, cooperative, in no acute distress   Head:    Normocephalic, without obvious abnormality, atraumatic   Eyes:            Lids and lashes normal, conjunctivae and sclerae normal, no   icterus, no pallor, corneas clear, PERRLA   Ears:    Ears appear intact with no abnormalities noted   Throat:   No oral lesions, no thrush, oral mucosa moist   Neck:   No adenopathy, supple, trachea midline, no thyromegaly, no     carotid bruit, no JVD   Back:     No kyphosis present, no scoliosis present, no skin lesions,       erythema or scars, no tenderness to percussion or                   palpation,   range of motion normal   Lungs:     Clear to auscultation,respirations regular, even and                   unlabored    Heart:    Regular rhythm and normal rate, normal S1 and S2, no            murmur, no gallop, no rub, no click   Breast Exam:    Deferred   Abdomen:     Normal bowel sounds, no masses, no organomegaly, soft        non-tender, non-distended, no guarding, no rebound                 tenderness   Genitalia:    Deferred   Extremities:   Moves all extremities well, no edema, no cyanosis, no              redness   Pulses:   Pulses palpable and equal bilaterally   Skin:   No bleeding, bruising or rash   Lymph nodes:   No palpable adenopathy   Neurologic:   Cranial nerves 2 - 12 grossly intact, sensation intact, DTR        present and equal bilaterally                 Assessment:  1.  Repeat Low Transverse  Section. POD#2.Tacycardia. No evidence of  PE.     Plan:  1. Will continue current plan of care      Abad Keller III, MD  01/08/22  13:10 EST

## 2022-01-08 NOTE — NURSING NOTE
Pt. Was breast feeding, nurse Johnna was in room helping when she noticed patient starting to shake and have shortness of breath, and tachycardia, a rapid response was called and dr. Mistry attended, lopressor iv was given and patient started to feel better, she is stable at this time

## 2022-01-08 NOTE — PROGRESS NOTES
CTPE followed up, negative for PE or acute infection, pelvis US reviewed, further imaging per primary, HR improved some to 130's, continued on BB, cultures pending, s/p 1 dose Ceftriaxone and continued on Azithromycin. No further fevers. BP improving.

## 2022-01-09 VITALS
SYSTOLIC BLOOD PRESSURE: 126 MMHG | HEIGHT: 67 IN | BODY MASS INDEX: 36.26 KG/M2 | DIASTOLIC BLOOD PRESSURE: 66 MMHG | TEMPERATURE: 98.3 F | RESPIRATION RATE: 20 BRPM | OXYGEN SATURATION: 97 % | HEART RATE: 74 BPM | WEIGHT: 231 LBS

## 2022-01-09 PROBLEM — Z34.90 PREGNANCY: Status: RESOLVED | Noted: 2022-01-02 | Resolved: 2022-01-09

## 2022-01-09 LAB
ANION GAP SERPL CALCULATED.3IONS-SCNC: 8.1 MMOL/L (ref 5–15)
BASOPHILS # BLD MANUAL: 0.04 10*3/MM3 (ref 0–0.2)
BASOPHILS NFR BLD MANUAL: 1 % (ref 0–1.5)
BUN SERPL-MCNC: 4 MG/DL (ref 6–20)
BUN/CREAT SERPL: 8.3 (ref 7–25)
CALCIUM SPEC-SCNC: 8.1 MG/DL (ref 8.6–10.5)
CHLORIDE SERPL-SCNC: 111 MMOL/L (ref 98–107)
CO2 SERPL-SCNC: 22.9 MMOL/L (ref 22–29)
CREAT SERPL-MCNC: 0.48 MG/DL (ref 0.57–1)
CRP SERPL-MCNC: 4.74 MG/DL (ref 0–0.5)
DEPRECATED RDW RBC AUTO: 44 FL (ref 37–54)
EOSINOPHIL # BLD MANUAL: 0.08 10*3/MM3 (ref 0–0.4)
EOSINOPHIL NFR BLD MANUAL: 2 % (ref 0.3–6.2)
ERYTHROCYTE [DISTWIDTH] IN BLOOD BY AUTOMATED COUNT: 13.6 % (ref 12.3–15.4)
GFR SERPL CREATININE-BSD FRML MDRD: >150 ML/MIN/1.73
GLUCOSE SERPL-MCNC: 85 MG/DL (ref 65–99)
HCT VFR BLD AUTO: 29.1 % (ref 34–46.6)
HGB BLD-MCNC: 9 G/DL (ref 12–15.9)
LYMPHOCYTES # BLD MANUAL: 1.51 10*3/MM3 (ref 0.7–3.1)
LYMPHOCYTES NFR BLD MANUAL: 24 % (ref 5–12)
MAGNESIUM SERPL-MCNC: 1.6 MG/DL (ref 1.6–2.6)
MCH RBC QN AUTO: 27.2 PG (ref 26.6–33)
MCHC RBC AUTO-ENTMCNC: 30.9 G/DL (ref 31.5–35.7)
MCV RBC AUTO: 87.9 FL (ref 79–97)
MONOCYTES # BLD: 0.93 10*3/MM3 (ref 0.1–0.9)
NEUTROPHILS # BLD AUTO: 1.32 10*3/MM3 (ref 1.7–7)
NEUTROPHILS NFR BLD MANUAL: 32 % (ref 42.7–76)
NEUTS BAND NFR BLD MANUAL: 2 % (ref 0–5)
PLAT MORPH BLD: NORMAL
PLATELET # BLD AUTO: 200 10*3/MM3 (ref 140–450)
PMV BLD AUTO: 10.6 FL (ref 6–12)
POTASSIUM SERPL-SCNC: 4 MMOL/L (ref 3.5–5.2)
RBC # BLD AUTO: 3.31 10*6/MM3 (ref 3.77–5.28)
RBC MORPH BLD: NORMAL
SCAN SLIDE: NORMAL
SODIUM SERPL-SCNC: 142 MMOL/L (ref 136–145)
VARIANT LYMPHS NFR BLD MANUAL: 39 % (ref 19.6–45.3)
WBC NRBC COR # BLD: 3.88 10*3/MM3 (ref 3.4–10.8)

## 2022-01-09 PROCEDURE — 99232 SBSQ HOSP IP/OBS MODERATE 35: CPT | Performed by: PHYSICIAN ASSISTANT

## 2022-01-09 PROCEDURE — 86140 C-REACTIVE PROTEIN: CPT | Performed by: PHYSICIAN ASSISTANT

## 2022-01-09 PROCEDURE — 85025 COMPLETE CBC W/AUTO DIFF WBC: CPT | Performed by: PHYSICIAN ASSISTANT

## 2022-01-09 PROCEDURE — 83735 ASSAY OF MAGNESIUM: CPT | Performed by: PHYSICIAN ASSISTANT

## 2022-01-09 PROCEDURE — 85007 BL SMEAR W/DIFF WBC COUNT: CPT | Performed by: PHYSICIAN ASSISTANT

## 2022-01-09 PROCEDURE — 80048 BASIC METABOLIC PNL TOTAL CA: CPT | Performed by: PHYSICIAN ASSISTANT

## 2022-01-09 RX ORDER — IBUPROFEN 800 MG/1
800 TABLET ORAL EVERY 8 HOURS SCHEDULED
Qty: 30 TABLET | Refills: 0 | Status: SHIPPED | OUTPATIENT
Start: 2022-01-09

## 2022-01-09 RX ORDER — OXYCODONE HYDROCHLORIDE AND ACETAMINOPHEN 5; 325 MG/1; MG/1
1 TABLET ORAL EVERY 4 HOURS PRN
Qty: 15 TABLET | Refills: 0 | Status: SHIPPED | OUTPATIENT
Start: 2022-01-09 | End: 2022-01-13

## 2022-01-09 RX ADMIN — IBUPROFEN 800 MG: 800 TABLET, FILM COATED ORAL at 05:54

## 2022-01-09 RX ADMIN — PRENATAL VIT W/ FE FUMARATE-FA TAB 27-0.8 MG 1 TABLET: 27-0.8 TAB at 08:31

## 2022-01-09 RX ADMIN — OXYCODONE HYDROCHLORIDE AND ACETAMINOPHEN 1 TABLET: 10; 325 TABLET ORAL at 01:28

## 2022-01-09 RX ADMIN — SODIUM CHLORIDE, PRESERVATIVE FREE 10 ML: 5 INJECTION INTRAVENOUS at 08:31

## 2022-01-09 RX ADMIN — SODIUM CHLORIDE, POTASSIUM CHLORIDE, SODIUM LACTATE AND CALCIUM CHLORIDE 125 ML/HR: 600; 310; 30; 20 INJECTION, SOLUTION INTRAVENOUS at 05:54

## 2022-01-09 RX ADMIN — OXYCODONE HYDROCHLORIDE AND ACETAMINOPHEN 1 TABLET: 10; 325 TABLET ORAL at 08:31

## 2022-01-09 RX ADMIN — METOPROLOL TARTRATE 25 MG: 25 TABLET ORAL at 08:31

## 2022-01-09 NOTE — PLAN OF CARE
Goal Outcome Evaluation:   Pt continues with telemetry; Normal sinus rhythm 70 and 80's;several episodesof tachycardia with activity 100-111.  No c/o of shortness of breath or chest tightness. Incisional discomfort relieved with prescribed pain regimen.  Pt breastfeeding infant and demonstrating effective care of self and infant..

## 2022-01-09 NOTE — DISCHARGE SUMMARY
Repeat Low Transverse  Section: Discharge Summary     Admit Date: 2022  5:53 AM    Admit Diagnosis: Pregnancy [Z34.90]    Date of Discharge:  2022    Discharge Diagnosis: Same        Hospital Course  Patient is a 32 y.o. female, G 2 now P . S/P Repeat Low Transverse  Section PPD#3. Patient doing well. No complaints. Wound: Clean, dry, intact. Patient tolerating a regular diet and ambulating without difficulty. Will discharge to home today.     Procedures Performed  Repeat Low Transverse  Section        Vital Signs  Temp:  [97.9 °F (36.6 °C)-98.8 °F (37.1 °C)] 98.3 °F (36.8 °C)  Heart Rate:  [] 74  Resp:  [18-20] 20  BP: (117-130)/(60-87) 126/66    Review of Systems    The following systems were reviewed and negative;  ENT, respiratory, cardiovascular, gastrointestinal, genitourinary, breast, endocrine and allergies / immunologic.      Physical Exam:      General Appearance:    Alert, cooperative, in no acute distress   Head:    Normocephalic, without obvious abnormality, atraumatic   Eyes:            Lids and lashes normal, conjunctivae and sclerae normal, no   icterus, no pallor, corneas clear, PERRLA   Ears:    Ears appear intact with no abnormalities noted   Throat:   No oral lesions, no thrush, oral mucosa moist   Neck:   No adenopathy, supple, trachea midline, no thyromegaly, no     carotid bruit, no JVD   Back:     No kyphosis present, no scoliosis present, no skin lesions,       erythema or scars, no tenderness to percussion or                   palpation,   range of motion normal   Lungs:     Clear to auscultation,respirations regular, even and                   unlabored    Heart:    Regular rhythm and normal rate, normal S1 and S2, no            murmur, no gallop, no rub, no click   Breast Exam:    Deferred   Abdomen:     Normal bowel sounds, no masses, no organomegaly, soft        non-tender, non-distended, no guarding, no rebound                 tenderness    Genitalia:    Deferred   Extremities:   Moves all extremities well, no edema, no cyanosis, no              redness   Pulses:   Pulses palpable and equal bilaterally   Skin:   No bleeding, bruising or rash   Lymph nodes:   No palpable adenopathy   Neurologic:   Cranial nerves 2 - 12 grossly intact, sensation intact, DTR        present and equal bilaterally             Condition on Discharge:  Stable    Urine Output Good    Discharge Diet: Regular    Discharge Medications  Percocet 5 mg q 6 #15    Activity at Discharge: No driving x 2 weeks. Nothing per vagina until cleared by a physician. Patient expected to return to work or school in 6 weeks.     Follow-up Appointments  Patient will follow up with Dr. Houston in 2 weeks.        Abad Keller MD.   01/09/22  10:50 EST

## 2022-01-09 NOTE — PROGRESS NOTES
[facility] HOSPITALIST PROGRESS NOTE     Patient Identification:  Name:  Yael Robbins  Age:  32 y.o.  Sex:  female  :  1990  MRN:  [id number]  Visit Number:  [id number]  Primary Care Provider:  Jada Perez DO    Date of admission: 2022  Length of stay:  3    ----------------------------------------------------------------------------------------------------------------------  Subjective     Chief Complaint:   Chief Complaint   Patient presents with   • Scheduled      RCS     Subjective/Interval History:    32 y.o. female who was admitted on 2022 for a scheduled  at 39a1d gestation. On POD #2 after her delivery, she developed fever and tachycardia with rates into the 150s. Hospitalist services were consulted.     PMH is significant for tachycardia with palpitations for which she has recently been following with cardiology as an outpatient. For complete admission information, please see history and physical and initial consultation note.     Procedures/Scans:  1. Transthoracic echocardiogram, 2022  · Estimated left ventricular EF = 75% Left ventricular ejection fraction appears to be greater than 70%. Left ventricular systolic function is hyperdynamic (EF > 70%).  · Estimated right ventricular systolic pressure from tricuspid regurgitation is normal (<35 mmHg).  · Mitral and aortic vavles show normal structure and funciton  · No pericardial effusion  · No prev echo for comparison  · Sinus tachycardia and hyperdynamic ventricle are noted.    Today, the patient reports that she is doing well.  She is hopeful to be discharged home.  Denies any further fever, chills, cough, chest discomfort, palpitations.  She has mild discomfort at her surgical site which is overall improving.  Edema of her bilateral lower extremities is overall stable. Discussed with women's health RN, patient reported to be doing well. No new events or concerns reported. No concerns  at her incision site.      Review of Systems   Constitutional: Negative for chills and fever.   Respiratory: Negative for cough and shortness of breath.    Cardiovascular: Positive for leg swelling (Stable/improving. ). Negative for chest pain and palpitations.   Gastrointestinal: Positive for abdominal pain (At surgical site, improving/stable. ). Negative for abdominal distention.   Genitourinary: Negative for difficulty urinating and dysuria.   Neurological: Negative for dizziness and syncope.   Psychiatric/Behavioral: Negative for agitation, behavioral problems and confusion.      Present during exam: Multiple family members at bedside.   ----------------------------------------------------------------------------------------------------------------------  Objective   [facility] [place] Meds:  ibuprofen, 800 mg, Oral, Q8H  magnesium oxide, 400 mg, Oral, Daily  metoprolol tartrate, 25 mg, Oral, Q12H  polyethylene glycol, 17 g, Oral, Daily  prenatal vitamin, 1 tablet, Oral, Daily  sodium chloride, 10 mL, Intravenous, Q12H      lactated ringers, 125 mL/hr, Last Rate: Stopped (01/06/22 0833)  lactated ringers, 125 mL/hr, Last Rate: 125 mL/hr (01/09/22 0554)      ----------------------------------------------------------------------------------------------------------------------  Vital Signs:  Temp:  [97.9 °F (36.6 °C)-98.8 °F (37.1 °C)] 98.3 °F (36.8 °C)  Heart Rate:  [] 74  Resp:  [18-20] 20  BP: (117-130)/(60-87) 126/66  Mean Arterial Pressure (Non-Invasive) for the past 24 hrs (Last 3 readings):   Noninvasive MAP (mmHg)   01/08/22 1700 80   01/08/22 1413 80     SpO2 Percentage    01/08/22 2300 01/09/22 0445 01/09/22 0800   SpO2: 97% 97% 97%     SpO2:  [97 %-99 %] 97 %  on   ;   Device (Oxygen Therapy): room air    Body mass index is 36.18 kg/m².  Wt Readings from Last 3 Encounters:   01/06/22 105 kg (231 lb)   01/02/22 105 kg (231 lb 12.8 oz)   10/28/21 98.9 kg (218 lb)        Intake/Output Summary (Last 24  hours) at 1/9/2022 1242  Last data filed at 1/8/2022 1705  Gross per 24 hour   Intake 963.05 ml   Output no documentation   Net 963.05 ml     Diet Regular  ----------------------------------------------------------------------------------------------------------------------  Physical exam:  Constitutional: Vital signs reviewed. Well-developed and well-nourished.  No respiratory distress on room air.      HENT:  Head:  Normocephalic and atraumatic.  Mouth:  Moist mucous membranes.    Eyes:  Conjunctivae and EOM are normal. No scleral icterus. No erythema or drainage.  Neck:  Neck supple.  Cardiovascular:  Normal rate, regular rhythm and normal heart sounds with no murmur.  Pulmonary/Chest:  No respiratory distress, no wheezes, no crackles, with normal breath sounds and good air movement.  Abdominal:  Soft.  Bowel sounds are present x4.  No distension.   Musculoskeletal:  No edema, no tenderness, and no deformity.  No red or swollen joints anywhere.    Neurological:  Alert and oriented to person, place, and time. No facial droop.  No slurred speech.   Skin:  Skin is warm and dry. No rash noted. No pallor.   Peripheral vascular: No clubbing, no cyanosis, no edema. 2+ pedal pulses bilaterally.     I have reviewed and updated the physical exam as needed to reflect that of 01/09/22  ----------------------------------------------------------------------------------------------------------------------  Tele:  Sinus rhythm in the 70s-90s.    ----------------------------------------------------------------------------------------------------------------------  Results from last 7 days   Lab Units 01/08/22  1425 01/08/22  0817 01/08/22  0525   TROPONIN T ng/mL <0.010 <0.010 <0.010           Results from last 7 days   Lab Units 01/09/22  1002 01/08/22  0601 01/07/22  0649   CRP mg/dL 4.74* 9.49*  --    LACTATE mmol/L  --  2.0  --    WBC 10*3/mm3 3.88 7.00 12.21*   HEMOGLOBIN g/dL 9.0* 9.8* 9.5*   HEMATOCRIT % 29.1* 30.8* 29.9*    MCV fL 87.9 86.0 84.7   MCHC g/dL 30.9* 31.8 31.8   PLATELETS 10*3/mm3 200 191 203     Results from last 7 days   Lab Units 01/09/22  1002 01/08/22  0601   SODIUM mmol/L 142 137   POTASSIUM mmol/L 4.0 3.7   MAGNESIUM mg/dL 1.6  --    CHLORIDE mmol/L 111* 105   CO2 mmol/L 22.9 20.2*   BUN mg/dL 4* 3*   CREATININE mg/dL 0.48* 0.51*   EGFR IF NONAFRICN AM mL/min/1.73 >150 142   CALCIUM mg/dL 8.1* 8.3*   GLUCOSE mg/dL 85 138*   ALBUMIN g/dL  --  2.91*   BILIRUBIN mg/dL  --  <0.2   ALK PHOS U/L  --  109   AST (SGOT) U/L  --  31   ALT (SGPT) U/L  --  15   Estimated Creatinine Clearance: 213.7 mL/min (A) (by C-G formula based on SCr of 0.48 mg/dL (L)).  No results found for: AMMONIA    Glucose   Date/Time Value Ref Range Status   01/08/2022 0513 124 70 - 130 mg/dL Final     Comment:     Meter: [id number] : [id number] Conner ORTEGA     No results found for: HGBA1C  Lab Results   Component Value Date    TSH 0.807 08/17/2021    FREET4 0.89 (L) 08/17/2021       Blood Culture   Date Value Ref Range Status   01/08/2022 No growth at 24 hours  Preliminary   01/08/2022 No growth at 24 hours  Preliminary     No results found for: URINECX  No results found for: WOUNDCX  No results found for: STOOLCX  No results found for: RESPCX  Pain Management Panel     Pain Management Panel Latest Ref Rng & Units 1/6/2022    AMPHETAMINES SCREEN, URINE Negative Negative    BARBITURATES SCREEN Negative Negative    BENZODIAZEPINE SCREEN, URINE Negative Negative    BUPRENORPHINEUR Negative Negative    COCAINE SCREEN, URINE Negative Negative    METHADONE SCREEN, URINE Negative Negative    METHAMPHETAMINEUR Negative Negative        I have personally reviewed the above laboratory results for 01/09/22  ----------------------------------------------------------------------------------------------------------------------  Imaging Results (Last 24 Hours)     ** No results found for the last 24 hours. **         ----------------------------------------------------------------------------------------------------------------------  Assessment/Plan     #Sinus tachycardia   #Episode of chest tightness, dyspnea   · Serial troponins negative x3.  · Recent Holter monitor September 2021 showed only 2 episodes of SVT with the longest being 3 beats.  · HR improved after IV fluids and increasing dose of home metoprolol tartrate to 25mg BID. BP stable.   · She has been maintaining SR in the 70s-90s overnight. No further chest tightness or SOA.  · CXR unremarkable per radiology.   · CT chest PE protocol shows no acute findings per radiology.  No pulmonary emboli.  · H/H with slow decline postoperatively, overall stable over the last 2 days.  · TTE report reviewed and listed above.  · Discussed with Dr. Farah, will continue metoprolol tartrate at 25mg BID. Encouraged the patient on avoiding caffeine and drinking plenty of water/fluids. Will get her a sooner follow up with her cardiologist, in 2-3 weeks if possible. I have confirmed with Dr. Keller that it is ok to continue the current dose of metoprolol given the patient is breastfeeding. New RX sent to our pharmacy.     #Fever, POD #2  · No further fever reported.  Infectious work-up negative so far.  · UA not concerning for acute UTI.  CXR and CT chest PE protocol showed no acute findings concerning for infiltrate.  · No reported concerns from surgical site.  · Blood cultures with no growth at 24 hours x 2.  Follow until finalized.  · Continue incentive spirometry.  · WBC count normalized.  · No further work-up/treatment indicated at this time from our standpoint.  Okay to continue antibiotics if primary team feels they are warranted.     #Mild borderline hypomagnesemia   · Place on PO supplementation with magnesium oxide at 400mg daily x3 days.     I have discussed the patient's assessment and plan with the patient, family at bedside, her RN, and attending physician, Dr. Farah. I  have also discussed with Dr. Keller.     Disposition: Plan is to discharge home today per primary team.     Discharge needs:  • Follow up with cardiology, 2-3 weeks.     Kelly Mistry PA-C  01/09/22  12:42 EST

## 2022-01-13 ENCOUNTER — TELEPHONE (OUTPATIENT)
Dept: OBSTETRICS AND GYNECOLOGY | Facility: HOSPITAL | Age: 32
End: 2022-01-13

## 2022-01-13 LAB
BACTERIA SPEC AEROBE CULT: NORMAL
BACTERIA SPEC AEROBE CULT: NORMAL

## 2022-01-13 NOTE — TELEPHONE ENCOUNTER
Postpartum Maternal Virtual Visit Form  Yael Robbins  [id number]  [unfilled]       Prenatal, Intrapartum, Postpartum Summary:       OB Provider: Dr. Houston    Other Providers: VÍCTOR    Other Services Used: [facility]       Prenatal Diagnoses: none    : 2     Para: 2     [place] Gender: male       Medications: Cannot display prior to admission medications because the patient has not been admitted in this contact.         Ibuprofen, PNV       Labor/Delivery Complications: None    Gestation at birth: 39w1d    Postpartum Complications: none    Delivery Method:  Section    Pain Relief Options During Hospital Stay: IV pain medication and oral pain medication     Smoking Status: Never smoker    Substance Use: Denies substance use/abuse        Feeding Plan: breastfeeding     Primary Language: English     Other: NA       Postpartum Depression Scale:       I am able to laugh and see the funny side of things: 0 = As much as I ever did    I can look forward to things with enjoyment: 0 = As much as I ever did    I blame myself unnecessarily when things went wron = Never    I find myself anxious of worried for no good reason: 0 = Never    I feel scared or panicky for no good reason: 1 = Not very often    Things have been getting on top of me: 0 = Never    I have been so unhappy that I have difficulty sleepin = Never    I feel (or have felt) sad or miserable: 0 = Never    I have been so unhappy that I have been cryin = Never    I have had thoughts of harming myself: 0 = Never    Postpartum Depression Scale Total Score: 1          Pain/Comfort/Sleep:     Pain Rating (Numeric Scale 1 - 10)  Current :  0    At Rest:  1    With Activity:  1    Acceptable Pain Level:  3    Pain Location: lower abdomen    Pain Description: intermittent, aching, burning and incisional    Pain Management Strategies (How do you treat your pain?): medication         Coping/Psychosocial:       Verbalized Emotional State:   Talked to mom of pt appt made. happy    Family/Support Network: spouse and family    Level of Involvement in Care: attentive, interactive and supportive       Safety:       Do you feel comfortable in your relationship with your baby?:  Yes    Have members of your household adjusted to your baby?: Yes    Is the baby's father supportive and/or involved with the baby?: Yes    How does your partner feel about the baby?: happy     Do you feel safe at home, school and work?: Yes    Are you in a relationship with someone who threatens you or hurts you?: No    Do you have the resources to keep yourself and your baby healthy and safe?: Yes       Review of Systems:       History obtained from the patient       Reproductive:       Lochia (per patient report):              Color:  brownish-red              Amount:  scant              Odor:  none    Breast (per patient report):              Left Breast:  tender and soft              Right Breast:   tender and soft              Left Nipple:  intact and tender              Right Nipple:  intact and tender              Breast Care:  supportive bra       Provider Notification:       Provider Name: VÍCTOR    Reason for Notification: NA    Response: NA       Education Discussion:       Postpartum Depression Screening:  Education provided    Peripheral Blood Glucose:  NA    Doctor Appointments:   Pt states she has appointments for both herself and her infant    Car Seat Safety:  NA    Immunization Schedule:  Per MD    Breast Feeding:  Education provided    Infant Safety:  Education provided    Family Planning:  Declined    Postpartum Care:  Education provided    S&S to report:  Education provided    Comments:  Spoke with patient over the phone. Patient states she and her infant daughter are doing well at home. Pt reports infant is latching onto breast well and is having good voids and stools. Pt states her abdominal incision is healing well and that she has minimal PP bleeding and pain. Pt reports she has all needed  supplies and resources. She denies any needs or concerns at this time.       Follow-Up Appoinments:       Follow-up Appointments made:  Yes -  Appointment Date: 01/24/2022      Provider/Agency: [facility]    Well Child Visit Appointment made:  Yes -  Appointment Date: 01/12/2022      Provider/Agency: [facility]       Visit Summary:       Visit Summary:  Visit completed: No referral needed       Additional Notes:         Melyssa Alexandra RN,  01/13/22 - 10:10 AM EST

## 2023-06-05 LAB — EXTERNAL GROUP B STREP ANTIGEN: NORMAL

## 2023-06-13 LAB
EXTERNAL ABO GROUPING: NORMAL
EXTERNAL ANTIBODY SCREEN: NEGATIVE
EXTERNAL HEPATITIS B SURFACE ANTIGEN: NEGATIVE
EXTERNAL RH FACTOR: POSITIVE
EXTERNAL RUBELLA QUALITATIVE: NORMAL
EXTERNAL SYPHILIS RPR SCREEN: NORMAL
HIV1 P24 AG SERPL QL IA: NORMAL

## 2023-12-10 ENCOUNTER — PREP FOR SURGERY (OUTPATIENT)
Dept: OTHER | Facility: HOSPITAL | Age: 33
End: 2023-12-10
Payer: MEDICAID

## 2023-12-10 DIAGNOSIS — Z30.2 ENCOUNTER FOR FEMALE STERILIZATION PROCEDURE: ICD-10-CM

## 2023-12-10 DIAGNOSIS — O34.219 HISTORY OF CESAREAN DELIVERY AFFECTING PREGNANCY: Primary | ICD-10-CM

## 2023-12-10 RX ORDER — OXYTOCIN/0.9 % SODIUM CHLORIDE 30/500 ML
250 PLASTIC BAG, INJECTION (ML) INTRAVENOUS CONTINUOUS
OUTPATIENT
Start: 2023-12-10 | End: 2023-12-10

## 2023-12-10 RX ORDER — SODIUM CHLORIDE 9 MG/ML
40 INJECTION, SOLUTION INTRAVENOUS AS NEEDED
OUTPATIENT
Start: 2023-12-10

## 2023-12-10 RX ORDER — OXYTOCIN/0.9 % SODIUM CHLORIDE 30/500 ML
999 PLASTIC BAG, INJECTION (ML) INTRAVENOUS ONCE
OUTPATIENT
Start: 2023-12-10 | End: 2023-12-10

## 2023-12-10 RX ORDER — ONDANSETRON 4 MG/1
4 TABLET, FILM COATED ORAL EVERY 6 HOURS PRN
OUTPATIENT
Start: 2023-12-10

## 2023-12-10 RX ORDER — SODIUM CHLORIDE 0.9 % (FLUSH) 0.9 %
10 SYRINGE (ML) INJECTION AS NEEDED
OUTPATIENT
Start: 2023-12-10

## 2023-12-10 RX ORDER — KETOROLAC TROMETHAMINE 30 MG/ML
30 INJECTION, SOLUTION INTRAMUSCULAR; INTRAVENOUS ONCE
OUTPATIENT
Start: 2023-12-10 | End: 2023-12-10

## 2023-12-10 RX ORDER — PROMETHAZINE HYDROCHLORIDE 12.5 MG/1
12.5 TABLET ORAL EVERY 6 HOURS PRN
OUTPATIENT
Start: 2023-12-10

## 2023-12-10 RX ORDER — SODIUM CHLORIDE, SODIUM LACTATE, POTASSIUM CHLORIDE, CALCIUM CHLORIDE 600; 310; 30; 20 MG/100ML; MG/100ML; MG/100ML; MG/100ML
125 INJECTION, SOLUTION INTRAVENOUS CONTINUOUS
OUTPATIENT
Start: 2023-12-10

## 2023-12-10 RX ORDER — SODIUM CHLORIDE 0.9 % (FLUSH) 0.9 %
10 SYRINGE (ML) INJECTION EVERY 12 HOURS SCHEDULED
OUTPATIENT
Start: 2023-12-10

## 2023-12-10 RX ORDER — MAGNESIUM HYDROXIDE 1200 MG/15ML
3000 LIQUID ORAL ONCE AS NEEDED
OUTPATIENT
Start: 2023-12-10

## 2023-12-10 RX ORDER — CARBOPROST TROMETHAMINE 250 UG/ML
250 INJECTION, SOLUTION INTRAMUSCULAR AS NEEDED
OUTPATIENT
Start: 2023-12-10

## 2023-12-10 RX ORDER — LIDOCAINE HYDROCHLORIDE 10 MG/ML
0.5 INJECTION, SOLUTION INFILTRATION; PERINEURAL ONCE AS NEEDED
OUTPATIENT
Start: 2023-12-10

## 2023-12-10 RX ORDER — ONDANSETRON 2 MG/ML
4 INJECTION INTRAMUSCULAR; INTRAVENOUS EVERY 6 HOURS PRN
OUTPATIENT
Start: 2023-12-10

## 2023-12-10 RX ORDER — METHYLERGONOVINE MALEATE 0.2 MG/ML
200 INJECTION INTRAVENOUS AS NEEDED
OUTPATIENT
Start: 2023-12-10

## 2023-12-10 RX ORDER — PROMETHAZINE HYDROCHLORIDE 12.5 MG/1
12.5 SUPPOSITORY RECTAL EVERY 6 HOURS PRN
OUTPATIENT
Start: 2023-12-10

## 2023-12-10 RX ORDER — METOCLOPRAMIDE HYDROCHLORIDE 5 MG/ML
10 INJECTION INTRAMUSCULAR; INTRAVENOUS EVERY 6 HOURS PRN
OUTPATIENT
Start: 2023-12-10

## 2023-12-10 RX ORDER — MISOPROSTOL 100 UG/1
600 TABLET ORAL AS NEEDED
OUTPATIENT
Start: 2023-12-10

## 2023-12-10 RX ORDER — ACETAMINOPHEN 500 MG
1000 TABLET ORAL ONCE
OUTPATIENT
Start: 2023-12-10 | End: 2023-12-10

## 2023-12-10 RX ORDER — HYDROXYZINE 50 MG/1
50 TABLET, FILM COATED ORAL NIGHTLY PRN
OUTPATIENT
Start: 2023-12-10

## 2023-12-16 ENCOUNTER — HOSPITAL ENCOUNTER (OUTPATIENT)
Facility: HOSPITAL | Age: 33
Discharge: HOME | End: 2023-12-16
Attending: OBSTETRICS & GYNECOLOGY | Admitting: OBSTETRICS & GYNECOLOGY
Payer: MEDICAID

## 2023-12-16 ENCOUNTER — ANESTHESIA EVENT (OUTPATIENT)
Dept: LABOR AND DELIVERY | Facility: HOSPITAL | Age: 33
End: 2023-12-16
Payer: MEDICAID

## 2023-12-16 VITALS
WEIGHT: 221 LBS | BODY MASS INDEX: 34.69 KG/M2 | RESPIRATION RATE: 16 BRPM | HEIGHT: 67 IN | OXYGEN SATURATION: 100 % | DIASTOLIC BLOOD PRESSURE: 84 MMHG | HEART RATE: 98 BPM | TEMPERATURE: 98.2 F | SYSTOLIC BLOOD PRESSURE: 128 MMHG

## 2023-12-16 PROBLEM — Z34.90 PREGNANCY: Status: ACTIVE | Noted: 2023-12-16

## 2023-12-16 PROCEDURE — 59025 FETAL NON-STRESS TEST: CPT

## 2023-12-16 PROCEDURE — G0463 HOSPITAL OUTPT CLINIC VISIT: HCPCS

## 2023-12-16 NOTE — NON STRESS TEST
Yael Robbins, a  at 38w2d with an GUSTAVO of 2023, by Patient Reported, was seen at [facility] LABOR DELIVERY for a nonstress test.    Chief Complaint   Patient presents with    Decreased Fetal Movement     Having some irregular contractions, denies any LOF, or VB and has decreased fetal movement since this AM       Patient Active Problem List   Diagnosis    Pregnancy       Start Time: 1730  Stop Time: 1800    Interpretation A  Nonstress Test Interpretation A: Reactive  Comments A: Verified by Yuliana MANZANARES RN

## 2023-12-16 NOTE — DISCHARGE INSTRUCTIONS
Make sure and keep your scheduled follow up appt. Return to L&D for any signs or symptoms of labor, contractions, leaking of fluid, vaginal bleeding or if you are not feeling your baby move as usual

## 2023-12-21 ENCOUNTER — ANESTHESIA (OUTPATIENT)
Dept: LABOR AND DELIVERY | Facility: HOSPITAL | Age: 33
End: 2023-12-21
Payer: MEDICAID

## 2023-12-21 ENCOUNTER — HOSPITAL ENCOUNTER (INPATIENT)
Facility: HOSPITAL | Age: 33
LOS: 2 days | Discharge: HOME | End: 2023-12-23
Attending: OBSTETRICS & GYNECOLOGY | Admitting: OBSTETRICS & GYNECOLOGY
Payer: MEDICAID

## 2023-12-21 DIAGNOSIS — O34.219 HISTORY OF CESAREAN DELIVERY AFFECTING PREGNANCY: ICD-10-CM

## 2023-12-21 DIAGNOSIS — Z30.2 ENCOUNTER FOR FEMALE STERILIZATION PROCEDURE: ICD-10-CM

## 2023-12-21 PROBLEM — Z34.90 TERM PREGNANCY: Status: ACTIVE | Noted: 2023-12-21

## 2023-12-21 LAB
ABO GROUP BLD: NORMAL
AMPHET+METHAMPHET UR QL: NEGATIVE
AMPHETAMINES UR QL: NEGATIVE
BARBITURATES UR QL SCN: NEGATIVE
BASOPHILS # BLD AUTO: 0.03 10*3/MM3 (ref 0–0.2)
BASOPHILS NFR BLD AUTO: 0.3 % (ref 0–1.5)
BENZODIAZ UR QL SCN: NEGATIVE
BLD GP AB SCN SERPL QL: NEGATIVE
BUPRENORPHINE SERPL-MCNC: NEGATIVE NG/ML
CANNABINOIDS SERPL QL: NEGATIVE
COCAINE UR QL: NEGATIVE
DEPRECATED RDW RBC AUTO: 42.7 FL (ref 37–54)
EOSINOPHIL # BLD AUTO: 0.08 10*3/MM3 (ref 0–0.4)
EOSINOPHIL NFR BLD AUTO: 0.9 % (ref 0.3–6.2)
ERYTHROCYTE [DISTWIDTH] IN BLOOD BY AUTOMATED COUNT: 13.5 % (ref 12.3–15.4)
FENTANYL UR-MCNC: NEGATIVE NG/ML
HCT VFR BLD AUTO: 34.9 % (ref 34–46.6)
HGB BLD-MCNC: 11.5 G/DL (ref 12–15.9)
IMM GRANULOCYTES # BLD AUTO: 0.07 10*3/MM3 (ref 0–0.05)
IMM GRANULOCYTES NFR BLD AUTO: 0.8 % (ref 0–0.5)
LYMPHOCYTES # BLD AUTO: 2.06 10*3/MM3 (ref 0.7–3.1)
LYMPHOCYTES NFR BLD AUTO: 23.2 % (ref 19.6–45.3)
MCH RBC QN AUTO: 28.7 PG (ref 26.6–33)
MCHC RBC AUTO-ENTMCNC: 33 G/DL (ref 31.5–35.7)
MCV RBC AUTO: 87 FL (ref 79–97)
METHADONE UR QL SCN: NEGATIVE
MONOCYTES # BLD AUTO: 0.93 10*3/MM3 (ref 0.1–0.9)
MONOCYTES NFR BLD AUTO: 10.5 % (ref 5–12)
NEUTROPHILS NFR BLD AUTO: 5.72 10*3/MM3 (ref 1.7–7)
NEUTROPHILS NFR BLD AUTO: 64.3 % (ref 42.7–76)
NRBC BLD AUTO-RTO: 0 /100 WBC (ref 0–0.2)
OPIATES UR QL: NEGATIVE
OXYCODONE UR QL SCN: NEGATIVE
PCP UR QL SCN: NEGATIVE
PLATELET # BLD AUTO: 214 10*3/MM3 (ref 140–450)
PMV BLD AUTO: 10.5 FL (ref 6–12)
RBC # BLD AUTO: 4.01 10*6/MM3 (ref 3.77–5.28)
RH BLD: POSITIVE
T&S EXPIRATION DATE: NORMAL
TRICYCLICS UR QL SCN: NEGATIVE
WBC NRBC COR # BLD AUTO: 8.89 10*3/MM3 (ref 3.4–10.8)

## 2023-12-21 PROCEDURE — 25810000003 LACTATED RINGERS PER 1000 ML: Performed by: NURSE PRACTITIONER

## 2023-12-21 PROCEDURE — 25010000002 CEFAZOLIN PER 500 MG: Performed by: NURSE PRACTITIONER

## 2023-12-21 PROCEDURE — 25810000003 LACTATED RINGERS PER 1000 ML: Performed by: NURSE ANESTHETIST, CERTIFIED REGISTERED

## 2023-12-21 PROCEDURE — 85025 COMPLETE CBC W/AUTO DIFF WBC: CPT | Performed by: NURSE PRACTITIONER

## 2023-12-21 PROCEDURE — 25810000003 LACTATED RINGERS SOLUTION: Performed by: NURSE PRACTITIONER

## 2023-12-21 PROCEDURE — 25010000002 MORPHINE PER 10 MG: Performed by: NURSE ANESTHETIST, CERTIFIED REGISTERED

## 2023-12-21 PROCEDURE — 25010000002 BUPIVACAINE PF 0.75 % SOLUTION: Performed by: ANESTHESIOLOGY

## 2023-12-21 PROCEDURE — 25010000002 KETOROLAC TROMETHAMINE PER 15 MG: Performed by: OBSTETRICS & GYNECOLOGY

## 2023-12-21 PROCEDURE — 25010000002 ONDANSETRON PER 1 MG: Performed by: NURSE ANESTHETIST, CERTIFIED REGISTERED

## 2023-12-21 PROCEDURE — 86901 BLOOD TYPING SEROLOGIC RH(D): CPT | Performed by: NURSE PRACTITIONER

## 2023-12-21 PROCEDURE — 25010000002 KETOROLAC TROMETHAMINE PER 15 MG: Performed by: NURSE PRACTITIONER

## 2023-12-21 PROCEDURE — 86900 BLOOD TYPING SEROLOGIC ABO: CPT | Performed by: NURSE PRACTITIONER

## 2023-12-21 PROCEDURE — 80307 DRUG TEST PRSMV CHEM ANLYZR: CPT | Performed by: NURSE PRACTITIONER

## 2023-12-21 PROCEDURE — 25010000002 FENTANYL CITRATE (PF) 50 MCG/ML SOLUTION: Performed by: NURSE ANESTHETIST, CERTIFIED REGISTERED

## 2023-12-21 PROCEDURE — 25010000002 ONDANSETRON PER 1 MG: Performed by: OBSTETRICS & GYNECOLOGY

## 2023-12-21 PROCEDURE — 0UL70CZ OCCLUSION OF BILATERAL FALLOPIAN TUBES WITH EXTRALUMINAL DEVICE, OPEN APPROACH: ICD-10-PCS | Performed by: OBSTETRICS & GYNECOLOGY

## 2023-12-21 PROCEDURE — 59025 FETAL NON-STRESS TEST: CPT

## 2023-12-21 PROCEDURE — 86850 RBC ANTIBODY SCREEN: CPT | Performed by: NURSE PRACTITIONER

## 2023-12-21 DEVICE — CLIP FALLOP FILSHIE TI PR STRL BX/10: Type: IMPLANTABLE DEVICE | Site: FALLOPIAN TUBE | Status: FUNCTIONAL

## 2023-12-21 RX ORDER — SODIUM CHLORIDE 0.9 % (FLUSH) 0.9 %
10 SYRINGE (ML) INJECTION AS NEEDED
Status: DISCONTINUED | OUTPATIENT
Start: 2023-12-21 | End: 2023-12-21 | Stop reason: HOSPADM

## 2023-12-21 RX ORDER — NALOXONE HCL 0.4 MG/ML
0.1 VIAL (ML) INJECTION
Status: DISCONTINUED | OUTPATIENT
Start: 2023-12-21 | End: 2023-12-21

## 2023-12-21 RX ORDER — OXYTOCIN/0.9 % SODIUM CHLORIDE 30/500 ML
250 PLASTIC BAG, INJECTION (ML) INTRAVENOUS CONTINUOUS
Status: ACTIVE | OUTPATIENT
Start: 2023-12-21 | End: 2023-12-21

## 2023-12-21 RX ORDER — ALUMINA, MAGNESIA, AND SIMETHICONE 2400; 2400; 240 MG/30ML; MG/30ML; MG/30ML
15 SUSPENSION ORAL EVERY 4 HOURS PRN
Status: DISCONTINUED | OUTPATIENT
Start: 2023-12-21 | End: 2023-12-23 | Stop reason: HOSPADM

## 2023-12-21 RX ORDER — ACETAMINOPHEN 500 MG
1000 TABLET ORAL ONCE
Status: DISCONTINUED | OUTPATIENT
Start: 2023-12-21 | End: 2023-12-21

## 2023-12-21 RX ORDER — PHENYLEPHRINE HCL IN 0.9% NACL 1 MG/10 ML
SYRINGE (ML) INTRAVENOUS AS NEEDED
Status: DISCONTINUED | OUTPATIENT
Start: 2023-12-21 | End: 2023-12-21 | Stop reason: SURG

## 2023-12-21 RX ORDER — BUPIVACAINE HYDROCHLORIDE 7.5 MG/ML
INJECTION, SOLUTION EPIDURAL; RETROBULBAR
Status: DISCONTINUED | OUTPATIENT
Start: 2023-12-21 | End: 2023-12-21

## 2023-12-21 RX ORDER — SODIUM CHLORIDE 9 MG/ML
40 INJECTION, SOLUTION INTRAVENOUS AS NEEDED
Status: DISCONTINUED | OUTPATIENT
Start: 2023-12-21 | End: 2023-12-21 | Stop reason: HOSPADM

## 2023-12-21 RX ORDER — HYDROXYZINE HYDROCHLORIDE 25 MG/1
50 TABLET, FILM COATED ORAL NIGHTLY PRN
Status: DISCONTINUED | OUTPATIENT
Start: 2023-12-21 | End: 2023-12-21 | Stop reason: HOSPADM

## 2023-12-21 RX ORDER — DIPHENHYDRAMINE HYDROCHLORIDE 50 MG/ML
25 INJECTION INTRAMUSCULAR; INTRAVENOUS EVERY 4 HOURS PRN
Status: DISCONTINUED | OUTPATIENT
Start: 2023-12-21 | End: 2023-12-23 | Stop reason: HOSPADM

## 2023-12-21 RX ORDER — PROMETHAZINE HYDROCHLORIDE 12.5 MG/1
12.5 SUPPOSITORY RECTAL EVERY 6 HOURS PRN
Status: DISCONTINUED | OUTPATIENT
Start: 2023-12-21 | End: 2023-12-21 | Stop reason: HOSPADM

## 2023-12-21 RX ORDER — ONDANSETRON 2 MG/ML
4 INJECTION INTRAMUSCULAR; INTRAVENOUS EVERY 6 HOURS PRN
Status: DISCONTINUED | OUTPATIENT
Start: 2023-12-21 | End: 2023-12-23 | Stop reason: HOSPADM

## 2023-12-21 RX ORDER — PRENATAL VIT/IRON FUM/FOLIC AC 27MG-0.8MG
1 TABLET ORAL DAILY
Status: DISCONTINUED | OUTPATIENT
Start: 2023-12-22 | End: 2023-12-23 | Stop reason: HOSPADM

## 2023-12-21 RX ORDER — MAGNESIUM HYDROXIDE 1200 MG/15ML
3000 LIQUID ORAL ONCE AS NEEDED
Status: DISCONTINUED | OUTPATIENT
Start: 2023-12-21 | End: 2023-12-21 | Stop reason: HOSPADM

## 2023-12-21 RX ORDER — DIPHENHYDRAMINE HCL 25 MG
25 CAPSULE ORAL EVERY 4 HOURS PRN
Status: DISCONTINUED | OUTPATIENT
Start: 2023-12-21 | End: 2023-12-23 | Stop reason: HOSPADM

## 2023-12-21 RX ORDER — SODIUM CHLORIDE, SODIUM LACTATE, POTASSIUM CHLORIDE, CALCIUM CHLORIDE 600; 310; 30; 20 MG/100ML; MG/100ML; MG/100ML; MG/100ML
INJECTION, SOLUTION INTRAVENOUS CONTINUOUS PRN
Status: DISCONTINUED | OUTPATIENT
Start: 2023-12-21 | End: 2023-12-21 | Stop reason: SURG

## 2023-12-21 RX ORDER — CARBOPROST TROMETHAMINE 250 UG/ML
250 INJECTION, SOLUTION INTRAMUSCULAR EVERY 4 HOURS PRN
Status: DISCONTINUED | OUTPATIENT
Start: 2023-12-21 | End: 2023-12-23 | Stop reason: HOSPADM

## 2023-12-21 RX ORDER — MISOPROSTOL 100 UG/1
600 TABLET ORAL AS NEEDED
Status: DISCONTINUED | OUTPATIENT
Start: 2023-12-21 | End: 2023-12-21 | Stop reason: HOSPADM

## 2023-12-21 RX ORDER — METHYLERGONOVINE MALEATE 0.2 MG/ML
200 INJECTION INTRAVENOUS ONCE AS NEEDED
Status: DISCONTINUED | OUTPATIENT
Start: 2023-12-21 | End: 2023-12-23 | Stop reason: HOSPADM

## 2023-12-21 RX ORDER — OXYTOCIN/0.9 % SODIUM CHLORIDE 30/500 ML
999 PLASTIC BAG, INJECTION (ML) INTRAVENOUS ONCE
Status: COMPLETED | OUTPATIENT
Start: 2023-12-21 | End: 2023-12-21

## 2023-12-21 RX ORDER — CALCIUM CARBONATE 500 MG/1
1 TABLET, CHEWABLE ORAL EVERY 4 HOURS PRN
Status: DISCONTINUED | OUTPATIENT
Start: 2023-12-21 | End: 2023-12-23 | Stop reason: HOSPADM

## 2023-12-21 RX ORDER — ONDANSETRON 2 MG/ML
4 INJECTION INTRAMUSCULAR; INTRAVENOUS ONCE AS NEEDED
Status: DISCONTINUED | OUTPATIENT
Start: 2023-12-21 | End: 2023-12-23 | Stop reason: HOSPADM

## 2023-12-21 RX ORDER — ONDANSETRON 2 MG/ML
4 INJECTION INTRAMUSCULAR; INTRAVENOUS EVERY 6 HOURS PRN
Status: DISCONTINUED | OUTPATIENT
Start: 2023-12-21 | End: 2023-12-21 | Stop reason: HOSPADM

## 2023-12-21 RX ORDER — SODIUM CHLORIDE, SODIUM LACTATE, POTASSIUM CHLORIDE, CALCIUM CHLORIDE 600; 310; 30; 20 MG/100ML; MG/100ML; MG/100ML; MG/100ML
125 INJECTION, SOLUTION INTRAVENOUS CONTINUOUS
Status: DISCONTINUED | OUTPATIENT
Start: 2023-12-21 | End: 2023-12-21

## 2023-12-21 RX ORDER — ONDANSETRON 4 MG/1
4 TABLET, ORALLY DISINTEGRATING ORAL EVERY 6 HOURS PRN
Status: DISCONTINUED | OUTPATIENT
Start: 2023-12-21 | End: 2023-12-23 | Stop reason: HOSPADM

## 2023-12-21 RX ORDER — KETOROLAC TROMETHAMINE 30 MG/ML
30 INJECTION, SOLUTION INTRAMUSCULAR; INTRAVENOUS ONCE
Status: COMPLETED | OUTPATIENT
Start: 2023-12-21 | End: 2023-12-21

## 2023-12-21 RX ORDER — MORPHINE SULFATE 2 MG/ML
2 INJECTION, SOLUTION INTRAMUSCULAR; INTRAVENOUS
Status: DISCONTINUED | OUTPATIENT
Start: 2023-12-21 | End: 2023-12-21

## 2023-12-21 RX ORDER — ONDANSETRON 4 MG/1
4 TABLET, FILM COATED ORAL EVERY 6 HOURS PRN
Status: DISCONTINUED | OUTPATIENT
Start: 2023-12-21 | End: 2023-12-21 | Stop reason: HOSPADM

## 2023-12-21 RX ORDER — PROMETHAZINE HYDROCHLORIDE 25 MG/1
12.5 TABLET ORAL EVERY 6 HOURS PRN
Status: DISCONTINUED | OUTPATIENT
Start: 2023-12-21 | End: 2023-12-21 | Stop reason: HOSPADM

## 2023-12-21 RX ORDER — CARBOPROST TROMETHAMINE 250 UG/ML
250 INJECTION, SOLUTION INTRAMUSCULAR AS NEEDED
Status: DISCONTINUED | OUTPATIENT
Start: 2023-12-21 | End: 2023-12-21 | Stop reason: HOSPADM

## 2023-12-21 RX ORDER — ONDANSETRON 2 MG/ML
INJECTION INTRAMUSCULAR; INTRAVENOUS AS NEEDED
Status: DISCONTINUED | OUTPATIENT
Start: 2023-12-21 | End: 2023-12-21 | Stop reason: SURG

## 2023-12-21 RX ORDER — PRENATAL VIT/IRON FUM/FOLIC AC 27MG-0.8MG
1 TABLET ORAL DAILY
Status: DISCONTINUED | OUTPATIENT
Start: 2023-12-22 | End: 2023-12-21

## 2023-12-21 RX ORDER — MORPHINE SULFATE 0.5 MG/ML
INJECTION, SOLUTION EPIDURAL; INTRATHECAL; INTRAVENOUS AS NEEDED
Status: DISCONTINUED | OUTPATIENT
Start: 2023-12-21 | End: 2023-12-21 | Stop reason: SURG

## 2023-12-21 RX ORDER — OXYCODONE HYDROCHLORIDE 5 MG/1
5 TABLET ORAL EVERY 4 HOURS PRN
Status: DISCONTINUED | OUTPATIENT
Start: 2023-12-21 | End: 2023-12-23 | Stop reason: HOSPADM

## 2023-12-21 RX ORDER — SODIUM CHLORIDE 0.9 % (FLUSH) 0.9 %
10 SYRINGE (ML) INJECTION EVERY 12 HOURS SCHEDULED
Status: DISCONTINUED | OUTPATIENT
Start: 2023-12-21 | End: 2023-12-21 | Stop reason: HOSPADM

## 2023-12-21 RX ORDER — OXYCODONE HYDROCHLORIDE 10 MG/1
10 TABLET ORAL EVERY 4 HOURS PRN
Status: DISCONTINUED | OUTPATIENT
Start: 2023-12-21 | End: 2023-12-23 | Stop reason: HOSPADM

## 2023-12-21 RX ORDER — SIMETHICONE 80 MG
80 TABLET,CHEWABLE ORAL 4 TIMES DAILY PRN
Status: DISCONTINUED | OUTPATIENT
Start: 2023-12-21 | End: 2023-12-23 | Stop reason: HOSPADM

## 2023-12-21 RX ORDER — ACETAMINOPHEN 325 MG/1
650 TABLET ORAL EVERY 6 HOURS
Status: DISCONTINUED | OUTPATIENT
Start: 2023-12-22 | End: 2023-12-23 | Stop reason: HOSPADM

## 2023-12-21 RX ORDER — SODIUM CHLORIDE 0.9 % (FLUSH) 0.9 %
3-10 SYRINGE (ML) INJECTION AS NEEDED
Status: DISCONTINUED | OUTPATIENT
Start: 2023-12-21 | End: 2023-12-23 | Stop reason: HOSPADM

## 2023-12-21 RX ORDER — FENTANYL CITRATE 50 UG/ML
INJECTION, SOLUTION INTRAMUSCULAR; INTRAVENOUS AS NEEDED
Status: DISCONTINUED | OUTPATIENT
Start: 2023-12-21 | End: 2023-12-21 | Stop reason: SURG

## 2023-12-21 RX ORDER — DOCUSATE SODIUM 100 MG/1
100 CAPSULE, LIQUID FILLED ORAL 2 TIMES DAILY PRN
Status: DISCONTINUED | OUTPATIENT
Start: 2023-12-22 | End: 2023-12-23 | Stop reason: HOSPADM

## 2023-12-21 RX ORDER — EPHEDRINE SULFATE 5 MG/ML
INJECTION INTRAVENOUS AS NEEDED
Status: DISCONTINUED | OUTPATIENT
Start: 2023-12-21 | End: 2023-12-21 | Stop reason: SURG

## 2023-12-21 RX ORDER — ACETAMINOPHEN 500 MG
1000 TABLET ORAL EVERY 6 HOURS
Status: DISPENSED | OUTPATIENT
Start: 2023-12-21 | End: 2023-12-22

## 2023-12-21 RX ORDER — IBUPROFEN 600 MG/1
600 TABLET ORAL EVERY 6 HOURS
Status: DISCONTINUED | OUTPATIENT
Start: 2023-12-22 | End: 2023-12-23 | Stop reason: HOSPADM

## 2023-12-21 RX ORDER — BUPIVACAINE HYDROCHLORIDE 7.5 MG/ML
INJECTION, SOLUTION EPIDURAL; RETROBULBAR AS NEEDED
Status: DISCONTINUED | OUTPATIENT
Start: 2023-12-21 | End: 2023-12-21

## 2023-12-21 RX ORDER — MAGNESIUM HYDROXIDE 1200 MG/15ML
LIQUID ORAL AS NEEDED
Status: DISCONTINUED | OUTPATIENT
Start: 2023-12-21 | End: 2023-12-23 | Stop reason: HOSPADM

## 2023-12-21 RX ORDER — SODIUM CHLORIDE 9 MG/ML
40 INJECTION, SOLUTION INTRAVENOUS AS NEEDED
Status: DISCONTINUED | OUTPATIENT
Start: 2023-12-21 | End: 2023-12-23 | Stop reason: HOSPADM

## 2023-12-21 RX ORDER — LIDOCAINE HYDROCHLORIDE 10 MG/ML
0.5 INJECTION, SOLUTION INFILTRATION; PERINEURAL ONCE AS NEEDED
Status: DISCONTINUED | OUTPATIENT
Start: 2023-12-21 | End: 2023-12-21 | Stop reason: HOSPADM

## 2023-12-21 RX ORDER — KETOROLAC TROMETHAMINE 30 MG/ML
15 INJECTION, SOLUTION INTRAMUSCULAR; INTRAVENOUS EVERY 6 HOURS
Status: COMPLETED | OUTPATIENT
Start: 2023-12-21 | End: 2023-12-22

## 2023-12-21 RX ORDER — OXYTOCIN/0.9 % SODIUM CHLORIDE 30/500 ML
125 PLASTIC BAG, INJECTION (ML) INTRAVENOUS CONTINUOUS PRN
Status: DISCONTINUED | OUTPATIENT
Start: 2023-12-21 | End: 2023-12-23 | Stop reason: HOSPADM

## 2023-12-21 RX ORDER — METHYLERGONOVINE MALEATE 0.2 MG/ML
200 INJECTION INTRAVENOUS AS NEEDED
Status: DISCONTINUED | OUTPATIENT
Start: 2023-12-21 | End: 2023-12-21 | Stop reason: HOSPADM

## 2023-12-21 RX ORDER — METOCLOPRAMIDE HYDROCHLORIDE 5 MG/ML
10 INJECTION INTRAMUSCULAR; INTRAVENOUS EVERY 6 HOURS PRN
Status: DISCONTINUED | OUTPATIENT
Start: 2023-12-21 | End: 2023-12-21 | Stop reason: HOSPADM

## 2023-12-21 RX ORDER — BUPIVACAINE HYDROCHLORIDE 7.5 MG/ML
INJECTION, SOLUTION EPIDURAL; RETROBULBAR
Status: COMPLETED | OUTPATIENT
Start: 2023-12-21 | End: 2023-12-21

## 2023-12-21 RX ORDER — HYDROXYZINE HYDROCHLORIDE 25 MG/1
50 TABLET, FILM COATED ORAL NIGHTLY PRN
Status: DISCONTINUED | OUTPATIENT
Start: 2023-12-21 | End: 2023-12-23 | Stop reason: HOSPADM

## 2023-12-21 RX ADMIN — KETOROLAC TROMETHAMINE 30 MG: 30 INJECTION, SOLUTION INTRAMUSCULAR; INTRAVENOUS at 12:34

## 2023-12-21 RX ADMIN — KETOROLAC TROMETHAMINE 15 MG: 30 INJECTION, SOLUTION INTRAMUSCULAR; INTRAVENOUS at 16:13

## 2023-12-21 RX ADMIN — Medication 999 ML/HR: at 11:30

## 2023-12-21 RX ADMIN — FENTANYL CITRATE 25 MCG: 50 INJECTION INTRAMUSCULAR; INTRAVENOUS at 11:15

## 2023-12-21 RX ADMIN — ACETAMINOPHEN 1000 MG: 500 TABLET ORAL at 08:45

## 2023-12-21 RX ADMIN — BUPIVACAINE HYDROCHLORIDE 1.6 ML: 7.5 INJECTION, SOLUTION EPIDURAL; RETROBULBAR at 11:15

## 2023-12-21 RX ADMIN — EPHEDRINE SULFATE 30 MG: 5 INJECTION INTRAVENOUS at 11:23

## 2023-12-21 RX ADMIN — SODIUM CHLORIDE, POTASSIUM CHLORIDE, SODIUM LACTATE AND CALCIUM CHLORIDE: 600; 310; 30; 20 INJECTION, SOLUTION INTRAVENOUS at 11:14

## 2023-12-21 RX ADMIN — ONDANSETRON 4 MG: 2 INJECTION INTRAMUSCULAR; INTRAVENOUS at 11:15

## 2023-12-21 RX ADMIN — FENTANYL CITRATE 75 MCG: 50 INJECTION, SOLUTION INTRAMUSCULAR; INTRAVENOUS at 11:48

## 2023-12-21 RX ADMIN — SODIUM CHLORIDE, POTASSIUM CHLORIDE, SODIUM LACTATE AND CALCIUM CHLORIDE 125 ML/HR: 600; 310; 30; 20 INJECTION, SOLUTION INTRAVENOUS at 08:30

## 2023-12-21 RX ADMIN — MORPHINE SULFATE 0.3 MG: 0.5 INJECTION EPIDURAL; INTRATHECAL; INTRAVENOUS at 11:15

## 2023-12-21 RX ADMIN — SODIUM CHLORIDE, POTASSIUM CHLORIDE, SODIUM LACTATE AND CALCIUM CHLORIDE 1000 ML: 600; 310; 30; 20 INJECTION, SOLUTION INTRAVENOUS at 07:58

## 2023-12-21 RX ADMIN — ONDANSETRON 4 MG: 2 INJECTION INTRAMUSCULAR; INTRAVENOUS at 15:01

## 2023-12-21 RX ADMIN — Medication 100 MCG: at 11:23

## 2023-12-21 RX ADMIN — KETOROLAC TROMETHAMINE 15 MG: 30 INJECTION, SOLUTION INTRAMUSCULAR; INTRAVENOUS at 21:47

## 2023-12-21 RX ADMIN — ACETAMINOPHEN 1000 MG: 500 TABLET ORAL at 20:09

## 2023-12-21 RX ADMIN — CEFAZOLIN 2 G: 2 INJECTION, POWDER, FOR SOLUTION INTRAMUSCULAR; INTRAVENOUS at 11:20

## 2023-12-21 RX ADMIN — EPHEDRINE SULFATE 20 MG: 5 INJECTION INTRAVENOUS at 11:25

## 2023-12-21 NOTE — ANESTHESIA PROCEDURE NOTES
Spinal Block      Patient reassessed immediately prior to procedure    Patient location during procedure: OR  Start Time: 12/21/2023 11:15 AM  Indication:at surgeon's request, post-op pain management and procedure for pain  Performed By  CRNA/CAA: Crystal Garcia CRNA  Preanesthetic Checklist  Completed: patient identified, IV checked, site marked, risks and benefits discussed, surgical consent, monitors and equipment checked, pre-op evaluation and timeout performed  Spinal Block Prep:  Patient Position:sitting  Sterile Tech:cap, gloves, mask and sterile barriers  Prep:Betadine  Patient Monitoring:blood pressure monitoring, continuous pulse oximetry and EKG    Spinal Block Procedure  Approach:midline  Guidance:landmark technique  Location:L3-L4  Needle Type:Pencan  Needle Gauge:24 G  Placement of Spinal needle event:cerebrospinal fluid aspirated  Paresthesia: no  Fluid Appearance:clear  Medications: bupivacaine PF (MARCAINE) injection 0.75% - Intrathecal   1.6 mL - 12/21/2023 11:15:00 AM   Post Assessment  Patient Tolerance:patient tolerated the procedure well with no apparent complications  Complications no  Additional Notes  X 2 Sticks

## 2023-12-21 NOTE — H&P
Chief complaint   Chief Complaint   Patient presents with    Scheduled        History of Present Illness: Patient is a 33 y.o. female who presents with IUP at 39w0d weeks gestation for a scheduled repeat  section with salpingectomy with Dr. Houston. G 3       Past Medical History:   Diagnosis Date    Tachycardia      Blood Type: B +  Fetal Gender: Female  GBS: Negative    Failed 1hr. No 3 hr.    Past Surgical History:   Procedure Laterality Date     SECTION       SECTION N/A 2022    Procedure:  SECTION REPEAT;  Surgeon: Cathleen Houston DO;  Location: [facility] LABOR DELIVERY;  Service: Obstetrics/Gynecology;  Laterality: N/A;     Family History   Problem Relation Age of Onset    No Known Problems Mother     Hypertension Father     Leukemia Sister      Social History     Tobacco Use    Smoking status: Never    Smokeless tobacco: Never   Vaping Use    Vaping Use: Never used   Substance Use Topics    Alcohol use: Never    Drug use: Never     Medications Prior to Admission   Medication Sig Dispense Refill Last Dose    ibuprofen (ADVIL,MOTRIN) 800 MG tablet Take 1 tablet by mouth Every 8 (Eight) Hours. Indications: Mild to Moderate Pain 30 tablet 0     metoprolol tartrate (LOPRESSOR) 25 MG tablet Take 1 tablet by mouth 2 (Two) Times a Day. 60 tablet 0     Prenatal Vit-Fe Fumarate-FA (prenatal vitamin 27-0.8) 27-0.8 MG tablet tablet Take 1 tablet by mouth Daily.        Allergies:  Patient has no known allergies.      Vital Signs  Temp:  [98.1 °F (36.7 °C)] 98.1 °F (36.7 °C)  Heart Rate:  [97] 97  BP: (130)/(64) 130/64    Radiology  Imaging Results (Last 24 Hours)       ** No results found for the last 24 hours. **            Labs  Lab Results (last 24 hours)       ** No results found for the last 24 hours. **              Review of Systems    The following systems were reviewed and negative;  ENT, respiratory, cardiovascular, gastrointestinal, genitourinary,  breast, endocrine and allergies / immunologic.      Physical Exam:      General Appearance:    Alert, cooperative, in no acute distress   Head:    Normocephalic, without obvious abnormality, atraumatic   Eyes:            Lids and lashes normal, conjunctivae and sclerae normal, no   icterus, no pallor, corneas clear, PERRLA   Ears:    Ears appear intact with no abnormalities noted   Throat:   No oral lesions, no thrush, oral mucosa moist   Neck:   No adenopathy, supple, trachea midline, no thyromegaly, no     carotid bruit, no JVD   Back:     No kyphosis present, no scoliosis present, no skin lesions,       erythema or scars, no tenderness to percussion or                   palpation,   range of motion normal   Lungs:     Clear to auscultation,respirations regular, even and                   unlabored    Heart:    Regular rhythm and normal rate, normal S1 and S2, no            murmur, no gallop, no rub, no click   Breast Exam:    Deferred   Abdomen:     Normal bowel sounds, no masses, no organomegaly, soft        non-tender, non-distended, no guarding, no rebound                 tenderness   Genitalia:    Deferred   Extremities:   Moves all extremities well, no edema, no cyanosis, no              redness   Pulses:   Pulses palpable and equal bilaterally   Skin:   No bleeding, bruising or rash   Lymph nodes:   No palpable adenopathy   Neurologic:   Cranial nerves 2 - 12 grossly intact, sensation intact, DTR        present and equal bilaterally         Assessment: Patient is a 33 y.o. female who presents with IUP at 39w0d weeks gestation. Refused . Desires infertility. G 3 P    Chief Complaint   Patient presents with    Scheduled        Plan of Care: Admit. Proceed with repeat  section with salpingectomy.       Abad Keller III, MD  23  07:34 EST

## 2023-12-21 NOTE — ANESTHESIA PREPROCEDURE EVALUATION
Anesthesia Evaluation     Patient summary reviewed and Nursing notes reviewed   no history of anesthetic complications:   NPO Solid Status: > 8 hours  NPO Liquid Status: > 8 hours           Airway   Mallampati: II  TM distance: >3 FB  Neck ROM: full  No difficulty expected  Dental - normal exam     Pulmonary - negative pulmonary ROS and normal exam   Cardiovascular - negative cardio ROS and normal exam    ECG reviewed  Rhythm: regular  Rate: normal    (+) dysrhythmias (During last pregnancy only) Tachycardia      Neuro/Psych- negative ROS  GI/Hepatic/Renal/Endo - negative ROS   (+) obesity, morbid obesity    Musculoskeletal (-) negative ROS    Abdominal   (+) obese   Substance History - negative use     OB/GYN negative ob/gyn ROS   (+) Pregnant (IUP 39 0/7 weeks gest)        Other - negative ROS       ROS/Med Hx Other: Echo 1/8/2022:  ·Estimated left ventricular EF = 75% Left ventricular ejection fraction appears to be greater than 70%. Left ventricular systolic function is hyperdynamic (EF > 70%).  ·Estimated right ventricular systolic pressure from tricuspid regurgitation is normal (<35 mmHg).  ·Mitral and aortic vavles show normal structure and funciton  ·No pericardial effusion  ·No prev echo for comparison  ·Sinus tachycardia and hyperdynamic ventricle are noted.                  Anesthesia Plan    ASA 2     spinal and ITN     (Hx Tachycardia with previous pregnancy and was treated with metoprolol.  This pregnancy there has been no issues. )    Anesthetic plan, risks, benefits, and alternatives have been provided, discussed and informed consent has been obtained with: patient.  Pre-procedure education provided  Use of blood products discussed with patient  Consented to blood products.    Plan discussed with CRNA.    CODE STATUS:    Level Of Support Discussed With: Patient  Code Status (Patient has no pulse and is not breathing): CPR (Attempt to Resuscitate)  Medical Interventions (Patient has pulse or is  breathing): Full Support

## 2023-12-21 NOTE — OP NOTE
Repeat Low Transverse  Section with Bilateral Tubal Ligation Operation Note    Yael Robbins  2023  Gestational Age-39.0 weeks    Pre-op Diagnosis:   Pt 34 y/o  @ 39.0 weeks, h/o C/S x 2, desires sterilization, Refused     Post-op Diagnosis:     Same    Procedure(s):   SECTION REPEAT WITH TUBAL     Surgeon(s):  Cathleen Houston,     Anesthesia: Spinal    Staff:   Circulator: Farida Lester RN  Baby Nurse: Diane Garcia RN  Assistant: Hannah Cano  OB TECH: Kyara, Kellen Lee Armando    Estimated Blood Loss: 600cc    Time: 1129    Grafts/Implants: Two Filschie Clips    Procedure     The patient was prepped and draped in the normal sterile fashion. A Pfannenstiel skin incision was made with the scalpel and carried down through the underlying fascia. The fascia was nicked in the midline and extended laterally. The peritoneum was entered. The bladder blade was placed. The uterine incision was made with the scalpel. The infant was delivered in atraumatic fashion. The nares and mouth was bulb suctioned. Cord was clamped times 2 and cut. The placenta was delivered intact. The uterus was closed with #0 chromic in a running locking fashion. Two Filshie clips were placed. Both tubes noted to be totally occluded. The fascia was closed with 0 Vicryl. The subcutaneous tissue was closed with plain gut suture. The skin was closed with 4-0 Monocryl. The patient tolerated the procedure well and went to the recovery room in stable condition.     Assistant: Hannah Cano   was responsible for performing the following activities: Retraction, Suction, and Placing Dressing and their skilled assistance was necessary for the success of this case.     APGARS              GENDER  Female        Cathleen Houston,      Date: 2023  Time: 11:47 EST

## 2023-12-21 NOTE — PLAN OF CARE
Goal Outcome Evaluation: Delivery of infant girl without complications. Will continue to monitor for infection and bleeding. At this time bleeding within normal limits. Will instruct on how to keep wound clean

## 2023-12-21 NOTE — ANESTHESIA PROCEDURE NOTES
Spinal Block      Patient reassessed immediately prior to procedure    Patient location during procedure: OR  Start Time: 12/21/2023 11:15 AM  Indication:at surgeon's request, post-op pain management and procedure for pain  Performed By  CRNA/CAA: Crystal Garcia CRNA  Preanesthetic Checklist  Completed: patient identified, IV checked, site marked, risks and benefits discussed, surgical consent, monitors and equipment checked, pre-op evaluation and timeout performed  Spinal Block Prep:  Patient Position:sitting  Sterile Tech:cap, gloves, mask and sterile barriers  Prep:Betadine  Patient Monitoring:blood pressure monitoring, continuous pulse oximetry and EKG    Spinal Block Procedure  Approach:midline  Guidance:landmark technique  Location:L3-L4  Needle Type:Pencan  Needle Gauge:24 G  Placement of Spinal needle event:cerebrospinal fluid aspirated  Paresthesia: no  Fluid Appearance:clear  Medications: bupivacaine PF (MARCAINE) injection 0.75% - Epidural   1.6 mL - 12/21/2023 11:32:00 AM   Post Assessment  Patient Tolerance:patient tolerated the procedure well with no apparent complications  Complications no  Additional Notes  X2 sticks

## 2023-12-21 NOTE — NON STRESS TEST
Yael Guyn, a  at 39w0d with an GUSTAVO of 2023, by Patient Reported, was seen at [facility] LABOR DELIVERY for a nonstress test.    Chief Complaint   Patient presents with    Scheduled        Patient Active Problem List   Diagnosis    Pregnancy       Start Time: 715  Stop Time: 759    Interpretation A  Nonstress Test Interpretation A: Reactive  Comments A: TRACEY Pringle RN

## 2023-12-21 NOTE — ANESTHESIA POSTPROCEDURE EVALUATION
Patient: Yael Robbins    Procedure Summary       Date: 23 Room / Location:  [facility] /  [facility]    Anesthesia Start: 1103 Anesthesia Stop: 1150    Procedure:  SECTION REPEAT WITH TUBAL (Bilateral: Abdomen) Diagnosis:     Surgeons: Cathleen Houston DO Provider: Edmar Gusman DO    Anesthesia Type: spinal, ITN ASA Status: 2            Anesthesia Type: spinal, ITN    Vitals  Vitals Value Taken Time   /67 23 1220   Temp 97.7 °F (36.5 °C) 23 1155   Pulse 112 23 1225   Resp     SpO2 99 % 23 1225   Vitals shown include unfiled device data.        Post Anesthesia Care and Evaluation    Patient location during evaluation: PACU  Patient participation: complete - patient participated  Level of consciousness: awake and alert  Pain score: 0  Pain management: adequate    Airway patency: patent  Anesthetic complications: No anesthetic complications  PONV Status: controlled  Cardiovascular status: acceptable  Respiratory status: acceptable and room air  Hydration status: euvolemic    Comments: RR 16  No anesthesia care post op

## 2023-12-21 NOTE — PLAN OF CARE
Goal Outcome Evaluation:   Patient received to [facility] from labor and delivery. Incision clean and well approximated. Fundus firm and at midline. Bleeding WNL. Sorto catheter maintained. Pt intermittently nauseated; sips of clear liquids provided to pt and pt tolerating at this time. VSS.

## 2023-12-22 LAB
BASOPHILS # BLD AUTO: 0.03 10*3/MM3 (ref 0–0.2)
BASOPHILS NFR BLD AUTO: 0.3 % (ref 0–1.5)
DEPRECATED RDW RBC AUTO: 44.1 FL (ref 37–54)
EOSINOPHIL # BLD AUTO: 0.12 10*3/MM3 (ref 0–0.4)
EOSINOPHIL NFR BLD AUTO: 1.4 % (ref 0.3–6.2)
ERYTHROCYTE [DISTWIDTH] IN BLOOD BY AUTOMATED COUNT: 13.7 % (ref 12.3–15.4)
HCT VFR BLD AUTO: 30.5 % (ref 34–46.6)
HGB BLD-MCNC: 10.1 G/DL (ref 12–15.9)
IMM GRANULOCYTES # BLD AUTO: 0.06 10*3/MM3 (ref 0–0.05)
IMM GRANULOCYTES NFR BLD AUTO: 0.7 % (ref 0–0.5)
LYMPHOCYTES # BLD AUTO: 1.8 10*3/MM3 (ref 0.7–3.1)
LYMPHOCYTES NFR BLD AUTO: 20.6 % (ref 19.6–45.3)
MCH RBC QN AUTO: 29.4 PG (ref 26.6–33)
MCHC RBC AUTO-ENTMCNC: 33.1 G/DL (ref 31.5–35.7)
MCV RBC AUTO: 88.7 FL (ref 79–97)
MONOCYTES # BLD AUTO: 0.71 10*3/MM3 (ref 0.1–0.9)
MONOCYTES NFR BLD AUTO: 8.1 % (ref 5–12)
NEUTROPHILS NFR BLD AUTO: 6.02 10*3/MM3 (ref 1.7–7)
NEUTROPHILS NFR BLD AUTO: 68.9 % (ref 42.7–76)
NRBC BLD AUTO-RTO: 0 /100 WBC (ref 0–0.2)
PLATELET # BLD AUTO: 177 10*3/MM3 (ref 140–450)
PMV BLD AUTO: 10.6 FL (ref 6–12)
RBC # BLD AUTO: 3.44 10*6/MM3 (ref 3.77–5.28)
WBC NRBC COR # BLD AUTO: 8.74 10*3/MM3 (ref 3.4–10.8)

## 2023-12-22 PROCEDURE — 85025 COMPLETE CBC W/AUTO DIFF WBC: CPT | Performed by: OBSTETRICS & GYNECOLOGY

## 2023-12-22 PROCEDURE — 25010000002 KETOROLAC TROMETHAMINE PER 15 MG: Performed by: OBSTETRICS & GYNECOLOGY

## 2023-12-22 RX ADMIN — PRENATAL VIT W/ FE FUMARATE-FA TAB 27-0.8 MG 1 TABLET: 27-0.8 TAB at 09:09

## 2023-12-22 RX ADMIN — KETOROLAC TROMETHAMINE 15 MG: 30 INJECTION, SOLUTION INTRAMUSCULAR; INTRAVENOUS at 11:06

## 2023-12-22 RX ADMIN — IBUPROFEN 600 MG: 600 TABLET, FILM COATED ORAL at 17:39

## 2023-12-22 RX ADMIN — ACETAMINOPHEN 650 MG: 325 TABLET ORAL at 13:29

## 2023-12-22 RX ADMIN — IBUPROFEN 600 MG: 600 TABLET, FILM COATED ORAL at 21:42

## 2023-12-22 RX ADMIN — ACETAMINOPHEN 1000 MG: 500 TABLET ORAL at 07:00

## 2023-12-22 RX ADMIN — KETOROLAC TROMETHAMINE 15 MG: 30 INJECTION, SOLUTION INTRAMUSCULAR; INTRAVENOUS at 03:33

## 2023-12-22 NOTE — DISCHARGE INSTR - APPOINTMENTS
You have a follow-up appointment at [facility] on Friday, January 5, 2024 at 10:20 am with Dr. Abad Keller.

## 2023-12-22 NOTE — PLAN OF CARE
Goal Outcome Evaluation:   Patient ambulating independently. Incision clean and well approximated. Fundus firm and at midline. Bleeding WNL. Pt voiding spontaneously. Pain managed with scheduled medication this shift. No complaints or acute changes. VSS.

## 2023-12-22 NOTE — PLAN OF CARE
Goal Outcome Evaluation:      Vss. Fundus firm and midline. Light to moderate lochia noted. Incision clean and dry. Sorto catheter removed. Ambulating inside room independently.     Problem: Bleeding ( Delivery)  Goal: Bleeding is Controlled  Outcome: Ongoing, Progressing  Intervention: Monitor and Manage Intrapartum Bleeding  Description: Facilitate position change, if possible, to enhance maternal circulation and increase uterine blood flow (lateral position with hip wedge).  Maintain large-bore intravenous access; provide fluid resuscitation as indicated by maternal status.  Continue fetal heart rate monitoring until delivery.  Note presence of bleeding when membranes are ruptured; test for presence of fetal blood.  Assess bleeding amount; check underpads for presence of blood pooling; weigh pads for measurement accuracy (1gm equals 1mL).  Verify readiness and accessibility of blood products if transfusion needed.  Administer oxygen therapy in the presence of maternal hypoxia.  Prepare for expedited delivery with change in maternal or fetal status.  Prepare for uterotonic medication administration for bleeding management following delivery.  Anticipate need for further surgical intervention with uncontrolled bleeding (e.g., uterine compression sutures, vessel ligation or embolization, hysterectomy).  Maintain calm, reassuring approach with patient and family; answer questions; provide information and timely responses to assist with anxiety reduction.  Recent Flowsheet Documentation  Taken 2023 by Xiomara Blanchard, RN   Bleed Management: Rh status confirmed     Problem: Change in Fetal Wellbeing ( Delivery)  Goal: Stable Fetal Wellbeing  Outcome: Ongoing, Progressing     Problem: Infection ( Delivery)  Goal: Absence of Infection Signs and Symptoms  Outcome: Ongoing, Progressing  Intervention: Minimize Infection Risk  Description: Verify Group B streptococcus status and  presence of known infection.  Prepare to administer antimicrobial therapy prophylaxis within 60 minutes prior to delivery, unless there is current coverage provided by existing antimicrobial therapy regimen.  Note: If  delivery is urgently needed, prophylaxis should be administered as soon as possible following incision.  Assist with obtaining cultures, as indicated.  Limit digital vaginal exams or invasive vaginal procedures, especially after membranes are ruptured.  Notify  team for delivery.  Prepare to send placenta for histology following delivery, as indicated.  Provide fever-reduction and comfort measures, as needed.  Recent Flowsheet Documentation  Taken 2023 by Xiomara Blanchard, RN  Infection Prevention:   hand hygiene promoted   rest/sleep promoted     Problem: Respiratory Compromise ( Delivery)  Goal: Effective Oxygenation and Ventilation  Outcome: Ongoing, Progressing     Problem: Adjustment to Role Transition (Postpartum  Delivery)  Goal: Successful Maternal Role Transition  Outcome: Ongoing, Progressing  Intervention: Support Maternal Role Transition  Description: Develop trust, relationship and rapport.  Use a family-focused approach; promote involvement of support system in infant care.  Incorporate cultural beliefs, rituals and practices in family-centered care.  Encourage and support breastfeeding, frequent holding and skin-to-skin contact with .  Encourage attachment behaviors; promote involvement in infant care.  Monitor maternal emotional state, as well as maternal-infant interaction.  Encourage and answer questions; share resources for support following discharge.  Recent Flowsheet Documentation  Taken 2023 by Xiomara Blanchard RN  Supportive Measures: active listening utilized     Problem: Bleeding (Postpartum  Delivery)  Goal: Hemostasis  Outcome: Ongoing, Progressing  Intervention: Monitor and Manage Postpartum  Bleeding  Description: Monitor uterine fundal height, position and consistency.  Continue uterine fundal massage if fundus remains boggy.  Encourage early breastfeeding.  Assess bleeding amount; check underpads for presence of blood pooling; weigh pads for measurement accuracy (1gm equals 1mL).  Verify bladder has been emptied; encourage voiding, perform catheterization if unable to void.  Anticipate need for vaginal exam to determine presence of lacerations or hematoma.  Watch for changes in blood pressure, heart rate and urine output.  Anticipate administration of uterotonic medication.  Prepare for additional bleeding-control measures (e.g., balloon tamponade, vaginal packing).  Anticipate rapid infusion of intravenous fluid and blood product administration.  Verify readiness and accessibility of blood products if transfusion needed.  Monitor for coagulopathy and signs of abnormal bleeding (e.g., oozing from intravenous sites, petechiae).  Anticipate need for surgical intervention with uncontrolled bleeding (e.g., uterine compression sutures, vessel ligation or embolization, hysterectomy).  Maintain calm, reassuring approach with patient and family; answer questions; provide information and timely responses to assist with anxiety reduction.  Recent Flowsheet Documentation  Taken 2023 by Xiomara Blanchard, RN   Bleed Management: Rh status confirmed     Problem: Infection (Postpartum  Delivery)  Goal: Absence of Infection Signs and Symptoms  Outcome: Ongoing, Progressing     Problem: Pain (Postpartum  Delivery)  Goal: Acceptable Pain Control  Outcome: Ongoing, Progressing  Intervention: Prevent or Manage Pain  Description: Determine pain management plan with patient and caregiver/family; review plan regularly.  Monitor for the presence of incisional pain; provide timely pain management interventions when present.  Use cold application, as culturally-appropriate, to the incisional  area for the first 24 to 48 hours to enhance comfort.  Encourage early ambulation, when able, to help avoid gas accumulation which can add to discomfort.  Verify correct infant latch when breastfeeding to prevent nipple pain.  Consider the presence and impact of preexisting chronic pain.  Encourage patient and caregiver involvement in pain assessment, interventions and safety measures.  Evaluate risk for opioid use; individualize pharmacologic pain management plan and titrate medication to patient response.  Combine multimodal analgesia and nonpharmacologic strategies to help potentiate synergistic effects and enhance comfort (e.g., complementary therapy, diversional activity).  Provide around-the-clock dosing of pain medication to keep pain levels in control.  Manage medication-induced effects, such as constipation, nausea, vomiting.  Support and optimize psychosocial response to pain.  If engorgement occurs, encourage more frequent breastfeeding or pumping and storing additional milk to ease discomfort. Note: Cold compresses, as culturally-appropriate, may be used if bottle-feeding.  If post-dural puncture headache identified, encourage adequate hydration and anticipate the need for epidural blood patch.  If hemorrhoids are present and painful, offer topical pain relief and sitz baths for comfort.  Recent Flowsheet Documentation  Taken 2023 by Xiomara Blanchard RN  Pain Management Interventions:   around-the-clock dosing utilized   pain management plan reviewed with patient/caregiver     Problem: Postoperative Nausea and Vomiting (Postpartum  Delivery)  Goal: Nausea and Vomiting Relief  Outcome: Ongoing, Progressing     Problem: Postoperative Urinary Retention (Postpartum  Delivery)  Goal: Effective Urinary Elimination  Outcome: Ongoing, Progressing  Intervention: Monitor and Manage Urinary Retention  Description: Monitor fluid balance to promote optimal hydration.  Assess for bladder  distension; encourage voiding within 6 hours following removal of indwelling catheter and regularly thereafter.  Promote behavioral methods to enhance voiding ability (e.g., utilize relaxation techniques, mutually establish a regular elimination schedule, allow time for bladder emptying, position to optimize flow; double voiding).  Implement methods to facilitate elimination (e.g., running water, warm water over perineum, sitz bath, privacy).  Utilize portable bladder ultrasound to assess pre- and postvoid volumes.  Anticipate the need for intermittent catheterization; advocate for early removal of indwelling device.  Facilitate urogenital hygiene to maintain perineal skin integrity.  Promote early mobilization to improve return of urinary tract function.  Recent Flowsheet Documentation  Taken 12/21/2023 2010 by Xiomara Blanchard, RN  Urinary Elimination Promotion: catheter patency maintained

## 2023-12-23 VITALS
TEMPERATURE: 98 F | DIASTOLIC BLOOD PRESSURE: 79 MMHG | BODY MASS INDEX: 35 KG/M2 | RESPIRATION RATE: 16 BRPM | HEART RATE: 91 BPM | HEIGHT: 67 IN | WEIGHT: 223 LBS | SYSTOLIC BLOOD PRESSURE: 121 MMHG | OXYGEN SATURATION: 100 %

## 2023-12-23 RX ORDER — IBUPROFEN 600 MG/1
600 TABLET ORAL EVERY 6 HOURS PRN
Qty: 24 TABLET | Refills: 0 | Status: SHIPPED | OUTPATIENT
Start: 2023-12-23

## 2023-12-23 RX ORDER — OXYCODONE HYDROCHLORIDE 5 MG/1
5 TABLET ORAL EVERY 8 HOURS PRN
Qty: 15 TABLET | Refills: 0 | Status: SHIPPED | OUTPATIENT
Start: 2023-12-23 | End: 2023-12-28

## 2023-12-23 RX ADMIN — IBUPROFEN 600 MG: 600 TABLET, FILM COATED ORAL at 03:19

## 2023-12-23 RX ADMIN — OXYCODONE 5 MG: 5 TABLET ORAL at 03:45

## 2023-12-23 RX ADMIN — ACETAMINOPHEN 650 MG: 325 TABLET ORAL at 01:01

## 2023-12-23 RX ADMIN — IBUPROFEN 600 MG: 600 TABLET, FILM COATED ORAL at 11:20

## 2023-12-23 RX ADMIN — ACETAMINOPHEN 650 MG: 325 TABLET ORAL at 07:36

## 2023-12-23 RX ADMIN — PRENATAL VIT W/ FE FUMARATE-FA TAB 27-0.8 MG 1 TABLET: 27-0.8 TAB at 08:35

## 2023-12-23 NOTE — DISCHARGE SUMMARY
[facility]  Delivery Discharge Summary    Primary OB Clinician:  Robbi Stephenson DO    EDC: Estimated Date of Delivery: 2023    Gestational Age:39w0d    Antepartum complications: none    Date of Delivery: 2023   Time of Delivery: 11:29 AM     Delivered By:  Cathleen Houston     Delivery Type: , Low Transverse        Baby:female  infant;   Apgar:  8  @ 1 minute /   Apgar:  9  @ 5 minutes   Weight: 3629 g (8 lb)    Length: 20.079     Anesthesia: Spinal      Intrapartum complications: None    Abdominal incision: Clean, dry and intact    Placenta: Spontaneous    Feeding method: Breastfeeding Status: Yes    Problem List:     delivery delivered    Term pregnancy        Presenting Problem/History of Present Illness  Term pregnancy [Z34.90]   delivery delivered [O82]  39 weeks [facility]  Patient is a 33 y.o. female presented with  Procedures Performed   section  Consults:   Consults       No orders found from 2023 to 2023.            Pertinent Test Results:    Condition on Discharge: Stable  Vital Signs  Temp:  [98 °F (36.7 °C)-98.5 °F (36.9 °C)] 98 °F (36.7 °C)  Heart Rate:  [78-91] 91  Resp:  [16] 16  BP: (121-123)/(78-79) 121/79    Physical Exam:     Constitutional:  Well-developed and well-nourished.  No respiratory distress.      HENT:  Head:  Normocephalic and atraumatic.  Mouth:  Moist mucous membranes.    Eyes:  Conjunctivae and EOM are normal.  No scleral icterus.    Neck:  Neck supple.  No JVD present.    Cardiovascular:  Normal rate, regular rhythm and normal heart sounds with no murmur. No edema.  Pulmonary/Chest:  No respiratory distress, no wheezes, no crackles, with normal breath sounds and good air movement.  Abdominal:  Soft.  Bowel sounds are normal.  No distension and no tenderness.   Incision: clean, dry and intact.   Musculoskeletal:  No edema, no tenderness, and no deformity.  No red or swollen joints anywhere.     Neurological:  Alert and oriented to person, place, and time. No facial droop.  No slurred speech.   Skin:  Skin is warm and dry. No rash noted. No pallor.   Pelvic Exam: deferred.     Discharge Disposition      Discharge Medications     Discharge Medications        ASK your doctor about these medications        Instructions Start Date   prenatal vitamin 27-0.8 27-0.8 MG tablet tablet   1 tablet, Oral, Daily               Discharge Diet   Regular    Activity at Discharge  Pelvic rest 6 weeks  Weight limit 20 pound for 1 month.     Follow-up Appointments  Follow-up with  in 2 weeks.    Discharge Date: 11/12/2023; Discharge Time: noon

## 2023-12-23 NOTE — PLAN OF CARE
Problem: Adjustment to Role Transition (Postpartum  Delivery)  Goal: Successful Maternal Role Transition  Outcome: Ongoing, Progressing     Problem: Bleeding (Postpartum  Delivery)  Goal: Hemostasis  Outcome: Ongoing, Progressing     Problem: Infection (Postpartum  Delivery)  Goal: Absence of Infection Signs and Symptoms  Outcome: Ongoing, Progressing     Problem: Pain (Postpartum  Delivery)  Goal: Acceptable Pain Control  Outcome: Ongoing, Progressing     Problem: Postoperative Nausea and Vomiting (Postpartum  Delivery)  Goal: Nausea and Vomiting Relief  Outcome: Ongoing, Progressing     Problem: Postoperative Urinary Retention (Postpartum  Delivery)  Goal: Effective Urinary Elimination  Outcome: Ongoing, Progressing     Problem: Adult Inpatient Plan of Care  Goal: Plan of Care Review  Outcome: Ongoing, Progressing  Flowsheets (Taken 2023 7297)  Progress: improving  Plan of Care Reviewed With:   patient   spouse  Outcome Evaluation: pt's vitals and bleeding wnl, voiding without difficulty, pain controlled with scheduled meds  Goal: Patient-Specific Goal (Individualized)  Outcome: Ongoing, Progressing  Goal: Absence of Hospital-Acquired Illness or Injury  Outcome: Ongoing, Progressing  Intervention: Identify and Manage Fall Risk  Description: Perform standard risk assessment on admission using a validated tool or comprehensive approach appropriate to the patient; reassess fall risk frequently, with change in status or transfer to another level of care.  Communicate fall injury risk to interprofessional healthcare team.  Determine need for increased observation, equipment and environmental modification, such as low bed, signage and supportive, nonskid footwear.  Adjust safety measures to individual developmental age, stage and identified risk factors.  Reinforce the importance of safety and physical activity with patient and family.  Perform regular intentional  rounding to assess need for position change, pain assessment and personal needs, including assistance with toileting.  Flowsheets (Taken 12/22/2023 2000)  Safety Promotion/Fall Prevention:   safety round/check completed   nonskid shoes/slippers when out of bed  Intervention: Prevent Skin Injury  Description: Perform a screening for skin injury risk, such as pressure or moisture associated skin damage on admission and at regular intervals throughout hospital stay.  Keep all areas of skin (especially folds) clean and dry.  Maintain adequate skin hydration.  Relieve and redistribute pressure and protect bony prominences; implement measures based on patient-specific risk factors.  Match turning and repositioning schedule to clinical condition.  Encourage weight shift frequently; assist with reposition if unable to complete independently.  Float heels off bed; avoid pressure on the Achilles tendon.  Keep skin free from extended contact with medical devices.  Encourage functional activity and mobility, as early as tolerated.  Use aids (e.g., slide boards, mechanical lift) during transfer.  Flowsheets (Taken 12/22/2023 1600 by Christine Zhou PCT)  Body Position: position changed independently  Intervention: Prevent and Manage VTE (Venous Thromboembolism) Risk  Description: Assess for VTE (venous thromboembolism) risk.  Encourage and assist with early ambulation.  Initiate and maintain compression or other therapy, as indicated, based on identified risk in accordance with organizational protocol and provider order.  Encourage both active and passive leg exercises while in bed, if unable to ambulate.  Flowsheets (Taken 12/22/2023 2000)  Activity Management: up ad sunita  Intervention: Prevent Infection  Description: Maintain skin and mucous membrane integrity; promote hand, oral and pulmonary hygiene.  Optimize fluid balance, nutrition, sleep and glycemic control to maximize infection resistance.  Identify potential sources of  infection early to prevent or mitigate progression of infection (e.g., wound, lines, devices).  Evaluate ongoing need for invasive devices; remove promptly when no longer indicated.  Flowsheets (Taken 12/22/2023 1600 by Christine Zhou PCT)  Infection Prevention:   hand hygiene promoted   rest/sleep promoted   single patient room provided  Goal: Optimal Comfort and Wellbeing  Outcome: Ongoing, Progressing  Intervention: Monitor Pain and Promote Comfort  Description: Assess pain level, treatment efficacy and patient response at regular intervals using a consistent pain scale.  Consider the presence and impact of preexisting chronic pain.  Encourage patient and caregiver involvement in pain assessment, interventions and safety measures.  Flowsheets (Taken 12/22/2023 0910 by Ely Guzman RN)  Pain Management Interventions:   around-the-clock dosing utilized   care clustered   pain management plan reviewed with patient/caregiver  Intervention: Provide Person-Centered Care  Description: Use a family-focused approach to care.  Develop trust and rapport by proactively providing information, encouraging questions, addressing concerns and offering reassurance.  Acknowledge emotional response to hospitalization.  Recognize and utilize personal coping strategies.  Honor spiritual and cultural preferences.  Flowsheets (Taken 12/22/2023 2000)  Trust Relationship/Rapport:   care explained   choices provided  Goal: Readiness for Transition of Care  Outcome: Ongoing, Progressing  Intervention: Mutually Develop Transition Plan  Description: Identify available resources for support (e.g., family, friends, community).  Identify and address barriers to ongoing treatment and home management (e.g., environmental, financial).  Provide opportunities to practice self-management skills.  Assess and monitor emotional readiness for transition.  Establish or reconnect linkage with outpatient providers or community-based services.  Flowsheets  (Taken 12/23/2023 8192)  Equipment Currently Used at Home: none  Concerns to be Addressed: no discharge needs identified  Readmission Within the Last 30 Days: no previous admission in last 30 days  Patient/Family Anticipated Services at Transition: none  Patient/Family Anticipates Transition to: home   Goal Outcome Evaluation:

## (undated) DEVICE — APPL CHLORAPREP W/TINT 26ML ORNG

## (undated) DEVICE — CONMED GOLDLINE ELECTROSURGICAL HANDPIECE, HAND CONTROLLED WITH ULTRACLEAN BLADE ELECTRODE, BUTTON SWITCH, SAFETY HOLSTER AND 10' (3 M) CABLE: Brand: CONMED GOLDLINE

## (undated) DEVICE — HYDROGEL COATED LATEX URINE METER FOLEY TRAY,16 FR/CH (5.3 MM), 5 ML CATHETER PRE-CONNECTED TO 2000 ML DRAINAGE BAG WITH NEEDLE SAMPLING: Brand: DOVER

## (undated) DEVICE — TRY SPINE PENCAN 24GA X4IN

## (undated) DEVICE — SLV SCD CALF HEMOFORCE DVT THERP REPR LG

## (undated) DEVICE — PK C/SECT 40

## (undated) DEVICE — SKIN AFFIX SURG ADHESIVE 72/CS 0.55ML: Brand: MEDLINE

## (undated) DEVICE — CUFF SCD HEMOFORCE SEQ CALF STD MD

## (undated) DEVICE — ENSEAL 20 CM SHAFT, LARGE JAW: Brand: ENSEAL X1

## (undated) DEVICE — PK C/SECT 70

## (undated) DEVICE — BG RESUSCITATOR INFANT BABYBLUE2